# Patient Record
Sex: FEMALE | Race: WHITE | NOT HISPANIC OR LATINO
[De-identification: names, ages, dates, MRNs, and addresses within clinical notes are randomized per-mention and may not be internally consistent; named-entity substitution may affect disease eponyms.]

---

## 2021-07-21 ENCOUNTER — RESULT CHARGE (OUTPATIENT)
Age: 51
End: 2021-07-21

## 2021-07-22 ENCOUNTER — APPOINTMENT (OUTPATIENT)
Dept: PEDIATRIC CARDIOLOGY | Facility: CLINIC | Age: 51
End: 2021-07-22
Payer: COMMERCIAL

## 2021-07-22 VITALS
HEIGHT: 64.57 IN | HEART RATE: 63 BPM | OXYGEN SATURATION: 98 % | WEIGHT: 171.96 LBS | SYSTOLIC BLOOD PRESSURE: 125 MMHG | DIASTOLIC BLOOD PRESSURE: 80 MMHG | BODY MASS INDEX: 29 KG/M2

## 2021-07-22 PROCEDURE — 99244 OFF/OP CNSLTJ NEW/EST MOD 40: CPT

## 2021-07-22 PROCEDURE — 93320 DOPPLER ECHO COMPLETE: CPT

## 2021-07-22 PROCEDURE — 93325 DOPPLER ECHO COLOR FLOW MAPG: CPT

## 2021-07-22 PROCEDURE — 93000 ELECTROCARDIOGRAM COMPLETE: CPT

## 2021-07-22 PROCEDURE — 93303 ECHO TRANSTHORACIC: CPT

## 2021-07-22 PROCEDURE — 99072 ADDL SUPL MATRL&STAF TM PHE: CPT

## 2021-07-22 NOTE — PHYSICAL EXAM
[General Appearance - Alert] : alert [Sclera] : the conjunctiva were normal [Examination Of The Oral Cavity] : mucous membranes were moist and pink [No Cough] : no cough [Sternotomy] : sternotomy [Well-Healed] : well-healed [Unremarkable] : unremarkable [Apical Impulse] : quiet precordium with normal apical impulse [Heart Rate And Rhythm] : normal heart rate and rhythm [Heart Sounds] : normal S1 and S2 [No Murmur] : no murmurs  [2+] : left 2+ [Abdomen Soft] : soft [] : no hepato-splenomegaly [Nail Clubbing] : no clubbing  or cyanosis of the fingers [Motor Tone] : normal muscle strength and tone [Skin Turgor] : normal turgor [Demonstrated Behavior - Infant Nonreactive To Parents] : interactive [Facies] : the head and face were normal in appearance [Edema] : no edema

## 2021-08-11 NOTE — REASON FOR VISIT
[Follow-Up] : a follow-up visit for [Ebstein's Anomaly] : Ebstein's anomaly [Patient] : patient [FreeTextEntry1] : s/p surgical correction, atrial reduction, Bidirectional Hi [FreeTextEntry3] : pressure in chest and swollen feet upon returning from [place]

## 2021-08-11 NOTE — CARDIOLOGY SUMMARY
[Today's Date] : [unfilled] [FreeTextEntry1] : NSR, ventricular rate 63 bpm, RBBB [FreeTextEntry2] : Summary:\par 1. 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 Status post placement of right bidirectional Hi shunt.\par 3. No residual interatrial shunt identified.\par 4. There is severe apical displacement of the septal leaflet with lack of delamination. There are\par attachments of the anterior leaflet to the anterior free wall and apex of the right ventricle. There appears\par to be redundancy of the anterior leaflet near the annulus - giving an appearance of flailed leaflet. There\par are multiple small jets of insufficiency through the anterior leaflet. There is lack of coaptation of the\par posterior leaflet with a large jet of insufficiency seen more posteriorly. There is at least moderate\par tricuspid regurgitation. The atrialized portion of the right ventricle functions as a "sink hole" and mostly\par swirling flow seen within that portion.\par 5. Severely dilated right atrium.\par 6. Laminar, low velocity Doppler flow pattern seen in the SVC. Hi anastomosis not well visualized.\par 7. Low velocity but pulsatile flow across the pulmonary valve.\par 8. The right pulmonary artery visualized with limited imaging, the left pulmonary artery could not be seen\par well.\par 9. Significant portion of the right ventricle is atrialized; qualitatively right ventricular systolic function\par appears mildly depressed.\par 10. Normal systolic excursion of the posterior wall of the left ventricle.\par 11. No pericardial effusion.

## 2021-08-11 NOTE — HISTORY OF PRESENT ILLNESS
[FreeTextEntry1] : We had the pleasure of seeing Pérez Wellington in the Adult [facility] of [facility] on July 22, 2021. Pérez has not been seen in our office since 2015. She returns today to re-establish care. Pérez is a 51 year old female with Ebstein's anomaly, s/p tricuspid valve repair, closure of atrial septal defect, atrial reduction, intraoperative atrial arrhythmia ablation and bidirectional Hi anastomosis. She has had two successful pregnancies with no significant cardiac complications.\par \par She continues to work as a psychotherapist and has been extremely busy since COVID. She Has been vaccinated with the Moderna vaccine.She does not participate in any structured exercise routine. She has been well with the exception one episode of chest pressure 3-4 weeks ago followed by one day of lower extremity edema. This occurred one day following a flight home from [place] and resolved within one day. She has had no re- occurrence.\par \par Her cardiovascular review of systems is completely unremarkable. Specifically, she has no complaints of chest pain, palpitations, shortness of breath, peripheral edema, dizziness or syncope.\par

## 2021-08-11 NOTE — DISCUSSION/SUMMARY
[FreeTextEntry1] : In summary, Pérez is a 51 year old female with Ebstein's anomaly who is status post Hi anastomosis and intraoperative radiofrequency ablation. She has had a 6 year lapse in care and now returns to re-establish care in our office. She  has had an uneventful interim course until a few weeks ago which probably prompted her return visit.\par \par Her bidirectional Hi and pulmonary arteries do not image well so we discussed obtaining a cardiac CT scan to better document her Hi and branch PA's. Otherwise her echocardiogram demonstrated a severely dilated RA with moderate TR and mildly depressed systolic RV function. We are able to see laminar flow in her SVC. We placed a Holter and discussed obtaining an exercise stress test once she has  conditioned herself better with a daily exercise routine.\par \par We will notify her of her Holter results and schedule her for her CT scan. She should continue to be followed annually or sooner if clinically indicated. [Needs SBE Prophylaxis] : [unfilled] does not need bacterial endocarditis prophylaxis

## 2021-08-11 NOTE — CONSULT LETTER
[Today's Date] : [unfilled] [Name] : Name: [unfilled] [] : : ~~ [Today's Date:] : [unfilled] [Dear  ___:] : Dear Dr. [unfilled]: [Consult] : I had the pleasure of evaluating your patient, [unfilled]. My full evaluation follows. [Sincerely,] : Sincerely, [Consult - Multiple Provider] : Thank you very much for allowing us to participate in the care of this patient. If you have any questions, please do not hesitate to contact us. [FreeTextEntry4] : Brady NATHAN MD [FreeTextEntry5] : [address] [place] [address], [address] [FreeTextEntry6] : [place]  [address] [FreeTextEnumf2] : Phone: [phone] [de-identified] : Sol Kee, MSN, CPNP-AC, PC\par Pediatric Cardiology, Adult Congenital Cardiology\par [facility]\par [facility]\par \par Delta Hope MD, GERARDO\par Medical Director, Adult Congenital Heart Program\par Professor of Pediatrics\par [employer] [employer] [facility]\par

## 2021-09-08 ENCOUNTER — APPOINTMENT (OUTPATIENT)
Dept: CT IMAGING | Facility: HOSPITAL | Age: 51
End: 2021-09-08

## 2021-09-23 ENCOUNTER — OUTPATIENT (OUTPATIENT)
Dept: OUTPATIENT SERVICES | Facility: HOSPITAL | Age: 51
LOS: 1 days | End: 2021-09-23

## 2021-09-23 ENCOUNTER — NON-APPOINTMENT (OUTPATIENT)
Age: 51
End: 2021-09-23

## 2021-09-23 ENCOUNTER — APPOINTMENT (OUTPATIENT)
Dept: CT IMAGING | Facility: HOSPITAL | Age: 51
End: 2021-09-23
Payer: COMMERCIAL

## 2021-09-23 ENCOUNTER — RESULT REVIEW (OUTPATIENT)
Age: 51
End: 2021-09-23

## 2021-09-23 DIAGNOSIS — Q22.5 EBSTEIN'S ANOMALY: ICD-10-CM

## 2021-09-23 PROCEDURE — 75573 CT HRT C+ STRUX CGEN HRT DS: CPT | Mod: 26

## 2021-09-27 ENCOUNTER — NON-APPOINTMENT (OUTPATIENT)
Age: 51
End: 2021-09-27

## 2023-10-23 ENCOUNTER — INPATIENT (INPATIENT)
Facility: HOSPITAL | Age: 53
LOS: 0 days | Discharge: HOME | DRG: 310 | End: 2023-10-24
Attending: INTERNAL MEDICINE | Admitting: INTERNAL MEDICINE
Payer: COMMERCIAL

## 2023-10-23 VITALS
RESPIRATION RATE: 16 BRPM | WEIGHT: 169.09 LBS | HEART RATE: 83 BPM | DIASTOLIC BLOOD PRESSURE: 91 MMHG | OXYGEN SATURATION: 100 % | SYSTOLIC BLOOD PRESSURE: 149 MMHG | TEMPERATURE: 98 F | HEIGHT: 66 IN

## 2023-10-23 DIAGNOSIS — I48.91 UNSPECIFIED ATRIAL FIBRILLATION: ICD-10-CM

## 2023-10-23 LAB
ALBUMIN SERPL ELPH-MCNC: 4.6 G/DL — SIGNIFICANT CHANGE UP (ref 3.3–5)
ALBUMIN SERPL ELPH-MCNC: 4.6 G/DL — SIGNIFICANT CHANGE UP (ref 3.3–5)
ALP SERPL-CCNC: 100 U/L — SIGNIFICANT CHANGE UP (ref 40–120)
ALP SERPL-CCNC: 100 U/L — SIGNIFICANT CHANGE UP (ref 40–120)
ALT FLD-CCNC: 23 U/L — SIGNIFICANT CHANGE UP (ref 10–45)
ALT FLD-CCNC: 23 U/L — SIGNIFICANT CHANGE UP (ref 10–45)
ANION GAP SERPL CALC-SCNC: 13 MMOL/L — SIGNIFICANT CHANGE UP (ref 5–17)
ANION GAP SERPL CALC-SCNC: 13 MMOL/L — SIGNIFICANT CHANGE UP (ref 5–17)
APTT BLD: 38.1 SEC — HIGH (ref 24.5–35.6)
APTT BLD: 38.1 SEC — HIGH (ref 24.5–35.6)
AST SERPL-CCNC: 29 U/L — SIGNIFICANT CHANGE UP (ref 10–40)
AST SERPL-CCNC: 29 U/L — SIGNIFICANT CHANGE UP (ref 10–40)
BASOPHILS # BLD AUTO: 0.01 K/UL — SIGNIFICANT CHANGE UP (ref 0–0.2)
BASOPHILS # BLD AUTO: 0.01 K/UL — SIGNIFICANT CHANGE UP (ref 0–0.2)
BASOPHILS NFR BLD AUTO: 0.2 % — SIGNIFICANT CHANGE UP (ref 0–2)
BASOPHILS NFR BLD AUTO: 0.2 % — SIGNIFICANT CHANGE UP (ref 0–2)
BILIRUB SERPL-MCNC: 1 MG/DL — SIGNIFICANT CHANGE UP (ref 0.2–1.2)
BILIRUB SERPL-MCNC: 1 MG/DL — SIGNIFICANT CHANGE UP (ref 0.2–1.2)
BUN SERPL-MCNC: 13 MG/DL — SIGNIFICANT CHANGE UP (ref 7–23)
BUN SERPL-MCNC: 13 MG/DL — SIGNIFICANT CHANGE UP (ref 7–23)
CALCIUM SERPL-MCNC: 9.8 MG/DL — SIGNIFICANT CHANGE UP (ref 8.4–10.5)
CALCIUM SERPL-MCNC: 9.8 MG/DL — SIGNIFICANT CHANGE UP (ref 8.4–10.5)
CHLORIDE SERPL-SCNC: 106 MMOL/L — SIGNIFICANT CHANGE UP (ref 96–108)
CHLORIDE SERPL-SCNC: 106 MMOL/L — SIGNIFICANT CHANGE UP (ref 96–108)
CO2 SERPL-SCNC: 21 MMOL/L — LOW (ref 22–31)
CO2 SERPL-SCNC: 21 MMOL/L — LOW (ref 22–31)
CREAT SERPL-MCNC: 0.54 MG/DL — SIGNIFICANT CHANGE UP (ref 0.5–1.3)
CREAT SERPL-MCNC: 0.54 MG/DL — SIGNIFICANT CHANGE UP (ref 0.5–1.3)
EGFR: 111 ML/MIN/1.73M2 — SIGNIFICANT CHANGE UP
EGFR: 111 ML/MIN/1.73M2 — SIGNIFICANT CHANGE UP
EOSINOPHIL # BLD AUTO: 0.04 K/UL — SIGNIFICANT CHANGE UP (ref 0–0.5)
EOSINOPHIL # BLD AUTO: 0.04 K/UL — SIGNIFICANT CHANGE UP (ref 0–0.5)
EOSINOPHIL NFR BLD AUTO: 1 % — SIGNIFICANT CHANGE UP (ref 0–6)
EOSINOPHIL NFR BLD AUTO: 1 % — SIGNIFICANT CHANGE UP (ref 0–6)
GLUCOSE SERPL-MCNC: 97 MG/DL — SIGNIFICANT CHANGE UP (ref 70–99)
GLUCOSE SERPL-MCNC: 97 MG/DL — SIGNIFICANT CHANGE UP (ref 70–99)
HCT VFR BLD CALC: 38.6 % — SIGNIFICANT CHANGE UP (ref 34.5–45)
HCT VFR BLD CALC: 38.6 % — SIGNIFICANT CHANGE UP (ref 34.5–45)
HGB BLD-MCNC: 12.5 G/DL — SIGNIFICANT CHANGE UP (ref 11.5–15.5)
HGB BLD-MCNC: 12.5 G/DL — SIGNIFICANT CHANGE UP (ref 11.5–15.5)
IMM GRANULOCYTES NFR BLD AUTO: 0.5 % — SIGNIFICANT CHANGE UP (ref 0–0.9)
IMM GRANULOCYTES NFR BLD AUTO: 0.5 % — SIGNIFICANT CHANGE UP (ref 0–0.9)
INR BLD: 1.14 RATIO — SIGNIFICANT CHANGE UP (ref 0.85–1.18)
INR BLD: 1.14 RATIO — SIGNIFICANT CHANGE UP (ref 0.85–1.18)
LYMPHOCYTES # BLD AUTO: 0.83 K/UL — LOW (ref 1–3.3)
LYMPHOCYTES # BLD AUTO: 0.83 K/UL — LOW (ref 1–3.3)
LYMPHOCYTES # BLD AUTO: 20.4 % — SIGNIFICANT CHANGE UP (ref 13–44)
LYMPHOCYTES # BLD AUTO: 20.4 % — SIGNIFICANT CHANGE UP (ref 13–44)
MCHC RBC-ENTMCNC: 29.5 PG — SIGNIFICANT CHANGE UP (ref 27–34)
MCHC RBC-ENTMCNC: 29.5 PG — SIGNIFICANT CHANGE UP (ref 27–34)
MCHC RBC-ENTMCNC: 32.4 GM/DL — SIGNIFICANT CHANGE UP (ref 32–36)
MCHC RBC-ENTMCNC: 32.4 GM/DL — SIGNIFICANT CHANGE UP (ref 32–36)
MCV RBC AUTO: 91 FL — SIGNIFICANT CHANGE UP (ref 80–100)
MCV RBC AUTO: 91 FL — SIGNIFICANT CHANGE UP (ref 80–100)
MONOCYTES # BLD AUTO: 0.34 K/UL — SIGNIFICANT CHANGE UP (ref 0–0.9)
MONOCYTES # BLD AUTO: 0.34 K/UL — SIGNIFICANT CHANGE UP (ref 0–0.9)
MONOCYTES NFR BLD AUTO: 8.4 % — SIGNIFICANT CHANGE UP (ref 2–14)
MONOCYTES NFR BLD AUTO: 8.4 % — SIGNIFICANT CHANGE UP (ref 2–14)
NEUTROPHILS # BLD AUTO: 2.83 K/UL — SIGNIFICANT CHANGE UP (ref 1.8–7.4)
NEUTROPHILS # BLD AUTO: 2.83 K/UL — SIGNIFICANT CHANGE UP (ref 1.8–7.4)
NEUTROPHILS NFR BLD AUTO: 69.5 % — SIGNIFICANT CHANGE UP (ref 43–77)
NEUTROPHILS NFR BLD AUTO: 69.5 % — SIGNIFICANT CHANGE UP (ref 43–77)
NRBC # BLD: 0 /100 WBCS — SIGNIFICANT CHANGE UP (ref 0–0)
NRBC # BLD: 0 /100 WBCS — SIGNIFICANT CHANGE UP (ref 0–0)
PLATELET # BLD AUTO: 144 K/UL — LOW (ref 150–400)
PLATELET # BLD AUTO: 144 K/UL — LOW (ref 150–400)
POTASSIUM SERPL-MCNC: 5 MMOL/L — SIGNIFICANT CHANGE UP (ref 3.5–5.3)
POTASSIUM SERPL-MCNC: 5 MMOL/L — SIGNIFICANT CHANGE UP (ref 3.5–5.3)
POTASSIUM SERPL-SCNC: 5 MMOL/L — SIGNIFICANT CHANGE UP (ref 3.5–5.3)
POTASSIUM SERPL-SCNC: 5 MMOL/L — SIGNIFICANT CHANGE UP (ref 3.5–5.3)
PROT SERPL-MCNC: 7.5 G/DL — SIGNIFICANT CHANGE UP (ref 6–8.3)
PROT SERPL-MCNC: 7.5 G/DL — SIGNIFICANT CHANGE UP (ref 6–8.3)
PROTHROM AB SERPL-ACNC: 12.5 SEC — SIGNIFICANT CHANGE UP (ref 9.5–13)
PROTHROM AB SERPL-ACNC: 12.5 SEC — SIGNIFICANT CHANGE UP (ref 9.5–13)
RBC # BLD: 4.24 M/UL — SIGNIFICANT CHANGE UP (ref 3.8–5.2)
RBC # BLD: 4.24 M/UL — SIGNIFICANT CHANGE UP (ref 3.8–5.2)
RBC # FLD: 14 % — SIGNIFICANT CHANGE UP (ref 10.3–14.5)
RBC # FLD: 14 % — SIGNIFICANT CHANGE UP (ref 10.3–14.5)
SODIUM SERPL-SCNC: 140 MMOL/L — SIGNIFICANT CHANGE UP (ref 135–145)
SODIUM SERPL-SCNC: 140 MMOL/L — SIGNIFICANT CHANGE UP (ref 135–145)
WBC # BLD: 4.07 K/UL — SIGNIFICANT CHANGE UP (ref 3.8–10.5)
WBC # BLD: 4.07 K/UL — SIGNIFICANT CHANGE UP (ref 3.8–10.5)
WBC # FLD AUTO: 4.07 K/UL — SIGNIFICANT CHANGE UP (ref 3.8–10.5)
WBC # FLD AUTO: 4.07 K/UL — SIGNIFICANT CHANGE UP (ref 3.8–10.5)

## 2023-10-23 PROCEDURE — 70498 CT ANGIOGRAPHY NECK: CPT | Mod: 26,MA

## 2023-10-23 PROCEDURE — 70496 CT ANGIOGRAPHY HEAD: CPT | Mod: 26,MA

## 2023-10-23 PROCEDURE — 99223 1ST HOSP IP/OBS HIGH 75: CPT

## 2023-10-23 PROCEDURE — 70450 CT HEAD/BRAIN W/O DYE: CPT | Mod: 26,MA,59

## 2023-10-23 PROCEDURE — 93010 ELECTROCARDIOGRAM REPORT: CPT

## 2023-10-23 PROCEDURE — 71045 X-RAY EXAM CHEST 1 VIEW: CPT | Mod: 26

## 2023-10-23 PROCEDURE — 99285 EMERGENCY DEPT VISIT HI MDM: CPT

## 2023-10-23 RX ORDER — MECLIZINE HCL 12.5 MG
25 TABLET ORAL ONCE
Refills: 0 | Status: COMPLETED | OUTPATIENT
Start: 2023-10-23 | End: 2023-10-23

## 2023-10-23 RX ORDER — APIXABAN 2.5 MG/1
5 TABLET, FILM COATED ORAL EVERY 12 HOURS
Refills: 0 | Status: DISCONTINUED | OUTPATIENT
Start: 2023-10-23 | End: 2023-10-24

## 2023-10-23 RX ADMIN — APIXABAN 5 MILLIGRAM(S): 2.5 TABLET, FILM COATED ORAL at 22:09

## 2023-10-23 RX ADMIN — Medication 25 MILLIGRAM(S): at 10:47

## 2023-10-23 NOTE — CONSULT NOTE ADULT - SUBJECTIVE AND OBJECTIVE BOX
Patient seen and evaluated at bedside    Chief Complaint: dizziness    HPI: 53 year old F with Ebstein's anomaly s/p b/l Hi, closure of atrial communication, and ablation of atrial flutter in 11/2002 by Dr. Ly (last seen by Dr. Hope 7/22/21) who presents for dizziness x 3 days.    She hasn't been seen in [facility] clinic since 2021. She is not on any medications at this time. She states she was in [facility] until 3 days ago when she started to have "dizziness/room spinning sensation" that occurred intermittently. She denies CP, SOB, orthopnea, PND, LE edema, LOC, or palpitations.       PMHx:       PSHx:       Allergies:  tetracycline (Rash)  erythromycin (Unknown)  penicillin (Unknown)      Home Meds:  None    FAMILY HISTORY:  N/A    Social History:  no toxic habits    REVIEW OF SYSTEMS:  CONSTITUTIONAL: No weakness, fevers or chills  EYES/ENT: No visual changes;  No dysphagia  NECK: No pain or stiffness  RESPIRATORY: No cough, wheezing, hemoptysis; No shortness of breath  CARDIOVASCULAR: No chest pain or palpitations; No lower extremity edema  GASTROINTESTINAL: No abdominal or epigastric pain. No nausea, vomiting, or hematemesis; No diarrhea or constipation. No melena or hematochezia.  BACK: No back pain  GENITOURINARY: No dysuria, frequency or hematuria  NEUROLOGICAL: No numbness or weakness  SKIN: No itching, burning, rashes, or lesions   All other review of systems is negative unless indicated above.    Physical Exam:  T(F): 98.5 (10-23), Max: 98.5 (10-23)  HR: 75 (10-23) (75 - 83)  BP: 133/88 (10-23) (133/88 - 149/91)  RR: 18 (10-23)  SpO2: 100% (10-23)  GENERAL: No acute distress, well-developed  HEAD:  Atraumatic, Normocephalic  ENT: EOMI, PERRLA, conjunctiva and sclera clear, Neck supple, No JVD, moist mucosa  CHEST/LUNG: Clear to auscultation bilaterally; No wheeze, equal breath sounds bilaterally   BACK: No spinal tenderness  HEART: Regular rate and rhythm; No murmurs, rubs, or gallops  ABDOMEN: Soft, Nontender, Nondistended; Bowel sounds present  EXTREMITIES:  No clubbing, cyanosis, or edema  PSYCH: Nl behavior, nl affect  NEUROLOGY: AAOx3, non-focal, cranial nerves intact  SKIN: Normal color, No rashes or lesions  LINES:    Labs:  reviewed                        12.5   4.07  )-----------( 144      ( 23 Oct 2023 10:54 )             38.6     10-23    140  |  106  |  13  ----------------------------<  97  5.0   |  21<L>  |  0.54    Ca    9.8      23 Oct 2023 10:54  Mg     2.1     10-23    TPro  7.5  /  Alb  4.6  /  TBili  1.0  /  DBili  x   /  AST  29  /  ALT  23  /  AlkPhos  100  10-23    PT/INR - ( 23 Oct 2023 11:20 )   PT: 12.5 sec;   INR: 1.14 ratio         PTT - ( 23 Oct 2023 11:20 )  PTT:38.1 sec    CARDIAC MARKERS ( 23 Oct 2023 10:54 )  7 ng/L / x     / x     / x     / x     / x      Cardiovascular Diagnostic Testing:    ECG: AFib with RBBB    Cardiac CT 9/2021: Impression: Known Ebstein anomaly. Qualitatively markedly dilated right atrium and right ventricle.    The right bidirectional Hi pathway appears patent, with the Hi connecting to the right pulmonary artery. The flow from SVC is directed to the RPA. The normal sized branch pulmonary arteries with no obvious discrete stenosis. Study non diagnostic for ruling out thrombus due to differential flow and poor contrast opacification.    Multiple veno venous collaterals (at least one prominent) seen from the SVC. The network of collaterals end in the mid right lung field.    Echo 7/2021: 1. 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  plication, right atrial MAZE procedure, patch closure of the secundum atrial septal defect and  ?  bidirectional Hi procedure.  ?  2. Status post placement of right bidirectional Hi shunt.  ?  3. No residual interatrial shunt identified.  ?  4. There is severe apical displacement of the septal leaflet with lack of delamination. There are  ?  attachments of the anterior leaflet to the anterior free wall and apex of the right ventricle. There appears  ?  to be redundancy of the anterior leaflet near the annulus - giving an appearance of flailed leaflet. There  ?  are multiple small jets of insufficiency through the anterior leaflet. There is lack of coaptation of the  ?  posterior leaflet with a large jet of insufficiency seen more posteriorly. There is at least moderate  ?  tricuspid regurgitation. The atrialized portion of the right ventricle functions as a "sink hole" and mostly  ?  swirling flow seen within that portion.  ?  5. Severely dilated right atrium.  ?  6. Laminar, low velocity Doppler flow pattern seen in the SVC. Hi anastomosis not well visualized.  ?  7. Low velocity but pulsatile flow across the pulmonary valve.  ?  8. The right pulmonary artery visualized with limited imaging, the left pulmonary artery could not be seen  ?  well.  ?  9. Significant portion of the right ventricle is atrialized; qualitatively right ventricular systolic function  ?  appears mildly depressed.  ?  10. Normal systolic excursion of the posterior wall of the left ventricle.  ?  11. No pericardial effusion.             
    HPI:53 year old F with Ebstein's anomaly s/p b/l Hi, closure of atrial communication, and ablation of atrial flutter in 11/2002 by Dr. Ly (last seen by Dr. Hope 7/22/21) who presents for dizziness x 3 days.    She hasn't been seen in [facility] clinic since 2021. She is not on any medications at this time. She states she was in [facility] until 3 days ago when she started to have "dizziness/room spinning sensation" that occurred intermittently. She denies CP, SOB, orthopnea, PND, LE edema, LOC, or palpitations.       PMH:   Ada Anomaly s/p B/L Hi Procedure  AF Ablation     PSH:     Medications:     Allergies:  tetracycline (Rash)  erythromycin (Unknown)  penicillin (Unknown)    FAMILY HISTORY:    Social History:  Smoking: denies  Alcohol: denies  Drugs: denies    Review of Systems:  Constitutional: [ ] Fever [ ] Chills [ ] Fatigue [ ] Weight change   HEENT: [ ] Blurred vision [ ] Eye Pain [ ] Headache [ ] Runny nose [ ] Sore Throat   Respiratory: [ ] Cough [ ] Wheezing [ ] Shortness of breath  Cardiovascular: [ ] Chest Pain [ ] Palpitations [ ] PARIKH [ ] PND [ ] Orthopnea  Gastrointestinal: [ ] Abdominal Pain [ ] Diarrhea [ ] Constipation [ ] Hemorrhoids [ ] Nausea [ ] Vomiting  Genitourinary: [ ] Nocturia [ ] Dysuria [ ] Incontinence  Extremities: [ ] Swelling [ ] Joint Pain  Neurologic: [ ] Focal deficit [ ] Paresthesias [ ] Syncope  Lymphatic: [ ] Swelling [ ] Lymphadenopathy   Skin: [ ] Rash [ ] Ecchymoses [ ] Wounds [ ] Lesions  Psychiatry: [ ] Depression [ ] Suicidal/Homicidal Ideation [ ] Anxiety [ ] Sleep Disturbances  [X ] 10 point review of systems is otherwise negative except as mentioned above            [ ]Unable to obtain    Physical Exam:  T(C): 36.9 (10-23-23 @ 11:15), Max: 36.9 (10-23-23 @ 11:15)  HR: 75 (10-23-23 @ 14:00) (75 - 83)  BP: 133/88 (10-23-23 @ 14:00) (133/88 - 149/91)  RR: 18 (10-23-23 @ 14:00) (16 - 18)  SpO2: 100% (10-23-23 @ 14:00) (100% - 100%)  Wt(kg): --    Daily Height in cm: 167.64 (23 Oct 2023 09:53)    Daily     Appearance: NAD  Eyes: PERRL, EOMI  HENT: Normal oral mucosa, NC/AT  Cardiovascular: normal S1 and S2, RRR, no m/r/g, no edema, normal JVP  Procedural Access Site:  No hematoma, non-tender to palpation, 2+ pulses distally, no bruit, no ecchymosis  Respiratory: Clear to auscultation bilaterally  Gastrointestinal: Soft, non-tender, non-distended, BS+  Musculoskeletal: No clubbing, no joint deformity   Neurologic: Non-focal  Lymphatic: No lymphadenopathy  Psychiatry: AAOx3, mood & affect appropriate  Skin: No rashes, no ecchymoses, no cyanosis    Cardiovascular Diagnostic Testing:        Labs:                        12.5   4.07  )-----------( 144      ( 23 Oct 2023 10:54 )             38.6     10-23    140  |  106  |  13  ----------------------------<  97  5.0   |  21<L>  |  0.54    Ca    9.8      23 Oct 2023 10:54  Mg     2.1     10-23    TPro  7.5  /  Alb  4.6  /  TBili  1.0  /  DBili  x   /  AST  29  /  ALT  23  /  AlkPhos  100  10-23    PT/INR - ( 23 Oct 2023 11:20 )   PT: 12.5 sec;   INR: 1.14 ratio         PTT - ( 23 Oct 2023 11:20 )  PTT:38.1 sec

## 2023-10-23 NOTE — ED PROVIDER NOTE - PROGRESS NOTE DETAILS
Manju Vaughn PGY2: EKG shows afib. Pt states she has not seen cardiologist or PCP in years and is not on any medications including AC. Will add trop, coags and BNP. Dispo likely admission for cardiology eval and comprehensive echo.

## 2023-10-23 NOTE — ED PROVIDER NOTE - DIFFERENTIAL DIAGNOSIS
Ddx includes, however, is not limited to: BPPV, arrhythmia, metabolic d/o, other Differential Diagnosis

## 2023-10-23 NOTE — ED PROVIDER NOTE - ATTENDING CONTRIBUTION TO CARE
I saw and evaluated patient with resident. I discussed H+P and MDM with resident. I agree with the statements made by the resident unless otherwise noted.
Finger infection

## 2023-10-23 NOTE — ED PROVIDER NOTE - OBJECTIVE STATEMENT
52 y/o F with PMHx of HTN, afib s/p ablation (not on AC), mitral valve repair, Ada abnormality presents to the ED for 3 days of dizziness. Pt states she has had intermittent sensation of room spinning over the last 3 days. Worse at night and with laying flat. Pt also endorses associated nausea and 1 episode of vomiting. Pt states she took her blood pressure due to these symptoms and it was elevated to 150s/100s. Pt states she was started on BP meds but only took them once a few years ago, states she "drinks tea to lower the blood pressure". Also endorses mild headache. Denies fevers, chills, vision changes, CP, SOB, abd pain, diarrhea, constipation, dysuria, hematuria.

## 2023-10-23 NOTE — ED PROVIDER NOTE - CLINICAL SUMMARY MEDICAL DECISION MAKING FREE TEXT BOX
54 y/o F with PMHx of HTN, afib s/p ablation (not on AC), mitral valve repair, Ada abnormality presents to the ED for 3 days of intermittent dizziness with n/v and hypertension. + horizontal nystagmus on exam. No other focal deficits. Most likely BPPV. Plan for labs, EKG and trial of meclizine. Will consider CT is no improvement in symptoms from meclizine. Dispo likely home. 54 y/o F with PMHx of HTN, afib s/p ablation (not on AC), mitral valve repair, Ada abnormality presents to the ED for 3 days of intermittent dizziness with n/v and hypertension. + horizontal nystagmus on exam. No other focal deficits. Most likely BPPV. Plan for labs, EKG and trial of meclizine. Will consider CT is no improvement in symptoms from meclizine. Dispo likely home.    APOLINAR Tompkins MD: Agree with resident/ACP MDM, assessment and plan as above. Pt appears to be in new onset Afib, which may be contributing to her sx. Plan for cardiac w/u as well, d/w cardiology - may require inpt admission for echo, rate control, anticoagulation

## 2023-10-23 NOTE — H&P ADULT - HISTORY OF PRESENT ILLNESS
54 y/o F with PMHx of HTN, afib s/p ablation (not on AC), mitral valve repair, Daa abnormality presents to the ED for 3 days of dizziness. Pt states she has had intermittent sensation of room spinning over the last 3 days. Worse at night and with laying flat. Pt also endorses associated nausea and 1 episode of vomiting. Pt states she took her blood pressure due to these symptoms and it was elevated to 150s/100s. Pt states she was started on BP meds but only took them once a few years ago, states she "drinks tea to lower the blood pressure". Also endorses mild headache. Denies fevers, chills, vision changes, CP, SOB, abd pain, diarrhea, constipation, dysuria, hematuria.   54 y/o F with PMHx of HTN, afib s/p ablation (not on AC), mitral valve repair, Ebstein abnormality presents to the ED for 3 days of dizziness. Pt states she has had intermittent sensation of room spinning over the last 3 days. Worse at night and with laying flat. Pt also endorses associated nausea and 1 episode of vomiting. Pt states she took her blood pressure due to these symptoms and it was elevated to 150s/100s. Pt states she was started on BP meds but only took them once a few years ago, states she "drinks tea to lower the blood pressure". Also endorses mild headache. Denies fevers, chills, vision changes, CP, SOB, abd pain, diarrhea, constipation, dysuria, hematuria.

## 2023-10-23 NOTE — CONSULT NOTE ADULT - ASSESSMENT
Assessment and Recommendations:  53 year old F with Ebstein's anomaly s/p b/l Hi, closure of atrial communication, and ablation of atrial flutter in 11/2002 by Dr. Ly (last seen by Dr. Hope 7/22/21) who presents for dizziness x 3 days, found to be in atrial fibrillation.    1) Ebstein's anomaly s/p b/l Hi  2) AFLUT ablation intraop  - She appears grossly euvolemic on exam  - EKG shows Afib with RBBB  - It is unclear if her dizziness is related to the atrial fibrillation  - f/u CTA H and N and NCHCT   - Check TTE  - Start DOAC  - Given her history of Ebstein's, I consulted EP to consider DCCV vs ablation. Will need TERESA prior. F/U EP recs  - I reached out to Dr. Hope's [facility] team as well (Dr. Quevedo as Dr. Hope no longer works at [facility])     Karl Cortez MD  Cardiology Attending    All Cardiology service information can be found 24/7 on [email or web address], password: [email or web address]

## 2023-10-23 NOTE — CONSULT NOTE ADULT - ASSESSMENT
Ms. Wellington is a 53F with a PMH of Ada Anomaly s/p tricuspid valve annuloplasty, Bidirectional Hi procedure, closure of ASD, and AFL Ablation 11/2002 presenting with dizziness x 3 days.     ECG: AFib with RBBB and possible LPFB    1. Dizziness  2. AF - CHADSVASC = 2 s/p AFL ablation 2002  3. Ada Anomaly s/p tricuspid valve annuloplasty, bidirectional Hi procedure, closure of ASD      RECOMMENDATIONS:            Note not final until signed by attending       Tammy Benitez MD  Cardiology Fellow PGY-6  Phone: [phone]    For all New Consults  [email or web address]   Login: [email or web address] Ms. Wellington is a 53F with a PMH of Ada Anomaly s/p tricuspid valve annuloplasty, Bidirectional Hi procedure, closure of ASD, and AFL Ablation 11/2002 presenting with dizziness x 3 days.     ECG: AFib with RBBB and possible LPFB    1. Dizziness  2. AF - CHADSVASC = 2 s/p AFL ablation 2002  3. Ada Anomaly s/p tricuspid valve annuloplasty, bidirectional Hi procedure, closure of ASD      RECOMMENDATIONS:            Note not final until signed by attending       Tammy Benitez MD  Cardiology Fellow PGY-6  Phone: [phone]    For all New Consults  [email or web address]   Login: [employer] Ms. Wellington is a 53F with a PMH of Ada Anomaly s/p tricuspid valve annuloplasty, Bidirectional Hi procedure, closure of ASD, and AFL Ablation 11/2002 presenting with dizziness x 3 days.     ECG: AFib with RBBB and possible LPFB    1. Dizziness  2. AF - CHADSVASC = 2 s/p AFL ablation 2002  3. Ada Anomaly s/p tricuspid valve annuloplasty, bidirectional Hi procedure, closure of ASD      RECOMMENDATIONS:            Note not final until signed by attending       Tammy Benitez MD  Cardiology Fellow PGY-6  Phone: [phone]    For all New Consults  [email or web address]   Login: [employer] Ms. Wellington is a 53F with a PMH of Ebstein Anomaly s/p tricuspid valve annuloplasty, Bidirectional Hi procedure, closure of ASD, and AFL Ablation 11/2002 presenting with dizziness x 3 days.     ECG: AFib with RBBB and possible LPFB    1. Dizziness  2. AF - CHADSVASC = 2 s/p AFL ablation 2002  3. Ebstein Anomaly s/p tricuspid valve annuloplasty, bidirectional Hi procedure, closure of ASD      RECOMMENDATIONS:            Note not final until signed by attending       Tammy Benitez MD  Cardiology Fellow PGY-6  Phone: [phone]    For all New Consults  [email or web address]   Login: [employer] Ms. Wellington is a 53F with a PMH of Ebstein Anomaly s/p tricuspid valve annuloplasty, Bidirectional Hi procedure, closure of ASD, and AFL Ablation 11/2002 presenting with dizziness x 3 days.     ECG: AFib with RBBB and possible LPFB    1. Dizziness  2. AF - CHADSVASC = 2 s/p AFL ablation 2002  3. Ebstein Anomaly s/p tricuspid valve annuloplasty, bidirectional Hi procedure, closure of ASD      RECOMMENDATIONS:            Note not final until signed by attending       Tammy Benitez MD  Cardiology Fellow PGY-6  Phone: [phone]    For all New Consults  [email or web address]   Login: [employer] Ms. Wellington is a 53F with a PMH of Ebstein Anomaly s/p tricuspid valve annuloplasty, Bidirectional Hi procedure, closure of ASD, and AFL Ablation 11/2002 presenting with dizziness x 3 days.     ECG: AFib with RBBB and possible LPFB    1. Dizziness  2. AF - CHADSVASC = 2 s/p AFL ablation 2002  3. Ebstein Anomaly s/p tricuspid valve annuloplasty, bidirectional Hi procedure, closure of ASD      RECOMMENDATIONS:            Note not final until signed by attending       Tammy Benitez MD  Cardiology Fellow PGY-6  Phone: [phone]    For all New Consults  [email or web address]   Login: [employer] Ms. Wellington is a 53F with a PMH of Ebstein Anomaly s/p tricuspid valve annuloplasty, Bidirectional Hi procedure, closure of ASD, and AFL Ablation 11/2002 presenting with dizziness x 3 days.     ECG: AFib with RBBB and possible LPFB    1. Dizziness  2. AF - CHADSVASC = 2 s/p AFL ablation 2002  3. Ebstein Anomaly s/p tricuspid valve annuloplasty, bidirectional Hi procedure, closure of ASD      RECOMMENDATIONS:  - F/u CTA H/N and CTH  - Agree with starting DOAc such as Eliquis  - Currently rate controlled AFib not on rate control therapy  - Would make NPO after MN tentatively for TERESA/DCCV  - Agree with TTE  - Check TSH level   - Wishes to have Pediatric Cardiologist Dr. Chente Bansal involved  - Needs outpatient sleep apnea test    - Keep Mg > 2 and K > 4  - Monitor on telemetry    Note not final until signed by attending       Tammy Benitez MD  Cardiology Fellow PGY-6  Phone: [phone]    For all New Consults  [email or web address]   Login: [employer]            Note not final until signed by attending       Tammy Benitez MD  Cardiology Fellow PGY-6  Phone: [phone]    For all New Consults  [email or web address]   Login: [employer] Ms. Wellington is a 53F with a PMH of Ebstein Anomaly s/p tricuspid valve annuloplasty, Bidirectional Hi procedure, closure of ASD, and AFL Ablation 11/2002 presenting with dizziness x 3 days.     ECG: AFib with RBBB and possible LPFB    1. Dizziness  2. AF - CHADSVASC = 2 s/p AFL ablation 2002  3. Ebstein Anomaly s/p tricuspid valve annuloplasty, bidirectional Hi procedure, closure of ASD      RECOMMENDATIONS:  - F/u CTA H/N and CTH  - Agree with starting DOAc such as Eliquis  - Currently rate controlled AFib not on rate control therapy  - Would make NPO after MN tentatively for TERESA/DCCV  - Agree with TTE  - Check TSH level   - Wishes to have Pediatric Cardiologist Dr. Chente Bansal involved  - Needs outpatient sleep apnea test    - Keep Mg > 2 and K > 4  - Monitor on telemetry    Note not final until signed by attending       Tammy Benitez MD  Cardiology Fellow PGY-6  Phone: [phone]    For all New Consults  [email or web address]   Login: [employer]            Note not final until signed by attending       Tammy Benitez MD  Cardiology Fellow PGY-6  Phone: [phone]    For all New Consults  [email or web address]   Login: [employer] Ms. Wellington is a 53F with a PMH of Ebstein Anomaly s/p tricuspid valve annuloplasty, Bidirectional Hi procedure, closure of ASD, and AFL Ablation 11/2002 presenting with dizziness x 3 days.     ECG: AFib with RBBB and possible LPFB    1. Dizziness  2. AF - CHADSVASC = 2 s/p AFL ablation 2002  3. Ebstein Anomaly s/p tricuspid valve annuloplasty, bidirectional Hi procedure, closure of ASD      RECOMMENDATIONS:  - F/u CTA H/N and CTH  - Agree with starting DOAc such as Eliquis  - Currently rate controlled AFib not on rate control therapy  - Would make NPO after MN tentatively for TERESA/DCCV  - Agree with TTE  - Check TSH level   - Wishes to have Pediatric Cardiologist Dr. Chente Bansal involved  - Needs outpatient sleep apnea test    - Keep Mg > 2 and K > 4  - Monitor on telemetry    Note not final until signed by attending       Tammy Benitez MD  Cardiology Fellow PGY-6  Phone: [phone]    For all New Consults  [email or web address]   Login: [employer]            Note not final until signed by attending       Tammy Benitez MD  Cardiology Fellow PGY-6  Phone: [phone]    For all New Consults  [email or web address]   Login: [employer] Ms. Wellington is a 53F with a PMH of Ebstein Anomaly s/p tricuspid valve annuloplasty, Bidirectional Hi procedure, closure of ASD, and AFL Ablation 11/2002 presenting with dizziness x 3 days.     ECG: AFib with RBBB and possible LPFB    1. Dizziness  2. AF - CHADSVASC = 2 s/p AFL ablation 2002  3. Ebstein Anomaly s/p tricuspid valve annuloplasty, bidirectional Hi procedure, closure of ASD      RECOMMENDATIONS:  - F/u CTA H/N and CTH  - Agree with starting DOAc such as Eliquis  - Afib currently rate controlled   - Would make NPO after MN tentatively for TERESA/DCCV  - Agree with TTE  - Check TSH level   - Wishes to have Pediatric Cardiologist Dr. Chente Bansal involved  - Needs outpatient sleep apnea test    - Keep Mg > 2 and K > 4  - Monitor on telemetry    Note not final until signed by attending       Tammy Benitez MD  Cardiology Fellow PGY-6  Phone: [phone]    For all New Consults  [email or web address]   Login: [employer]            Note not final until signed by attending       Tammy Benitez MD  Cardiology Fellow PGY-6  Phone: [phone]    For all New Consults  [email or web address]   Login: [employer] Ms. Wellington is a 53F with a PMH of Ebstein Anomaly s/p tricuspid valve annuloplasty, Bidirectional Hi procedure, closure of ASD, and AFL Ablation 11/2002 presenting with dizziness x 3 days.     ECG: AFib with RBBB and possible LPFB    1. Dizziness  2. AF - CHADSVASC = 2 s/p AFL ablation 2002  3. Ebstein Anomaly s/p tricuspid valve annuloplasty, bidirectional Hi procedure, closure of ASD      RECOMMENDATIONS:  - F/u CTA H/N and CTH  - Agree with starting DOAc such as Eliquis  - Afib currently rate controlled   - Would make NPO after MN tentatively for TERESA/DCCV  - Agree with TTE  - Check TSH level   - Wishes to have Pediatric Cardiologist Dr. Chente Basnal involved  - Needs outpatient sleep apnea test    - Keep Mg > 2 and K > 4  - Monitor on telemetry    Note not final until signed by attending       Tammy Benitez MD  Cardiology Fellow PGY-6  Phone: [phone]    For all New Consults  [email or web address]   Login: [employer]            Note not final until signed by attending       Tammy Benitez MD  Cardiology Fellow PGY-6  Phone: [phone]    For all New Consults  [email or web address]   Login: [employer] Ms. Wellington is a 53F with a PMH of Ebstein Anomaly s/p tricuspid valve annuloplasty, Bidirectional Hi procedure, closure of ASD, and AFL Ablation 11/2002 presenting with dizziness x 3 days.     ECG: AFib with RBBB and possible LPFB    1. Dizziness  2. AF - CHADSVASC = 2 s/p AFL ablation 2002  3. Ebstein Anomaly s/p tricuspid valve annuloplasty, bidirectional Hi procedure, closure of ASD      RECOMMENDATIONS:  - F/u CTA H/N and CTH  - Agree with starting DOAc such as Eliquis  - Afib currently rate controlled   - Would make NPO after MN tentatively for TERESA/DCCV  - Agree with TTE  - Check TSH level   - Wishes to have Pediatric Cardiologist Dr. Chente Bansal involved  - Needs outpatient sleep apnea test    - Keep Mg > 2 and K > 4  - Monitor on telemetry    Note not final until signed by attending       Tammy Benitez MD  Cardiology Fellow PGY-6  Phone: [phone]    For all New Consults  [email or web address]   Login: [employer]            Note not final until signed by attending       Tammy Benitez MD  Cardiology Fellow PGY-6  Phone: [phone]    For all New Consults  [email or web address]   Login: [employer]

## 2023-10-23 NOTE — ED ADULT NURSE NOTE - OBJECTIVE STATEMENT
54 y/o F A&Ox4 w/ PMH of mitral valve repairment, a fib (not on blood thinners) presents to ED complaining of dizziness and HTN. Pt endorses dizziness and states her BP is "high for her at 140/70." pt endorses episodes of nausea and vomiting last night and states that she needed help walking today which is not baseline for her. Pt is well appearing and in no acute distress. Pt denies nausea at this time. VSS. Pt placed CM and afib noted.

## 2023-10-23 NOTE — H&P ADULT - ASSESSMENT
The patient is a 53y Female complaining of Dizziness.    A Fib:    Tele  Cardio/EP eval appreciated  TTE  Eliquis  Possible DCCV      Ebstein anomaly:  Pediatric cardio eval

## 2023-10-23 NOTE — ED PROVIDER NOTE - PHYSICAL EXAMINATION
Constitutional: VS reviewed. Alert and orientedx3, well appearing, no apparent distress  HEENT: Atraumatic, EOMI, PERRL, horizontal nystagmus in leftward gaze  CV: RRR  Lungs: Clear and equal bilaterally, no wheezes, rales or crackles  Abdomen: Soft, nondistended, nontender  MSK: No deformities  Skin: Warm and dry. As visualized no rashes, lesions, bruising or erythema  Neuro: Strength 5/5 in all extremities. Sensation intact. No pronator drift. No facial droop.   Lymph: No pitting edema in extremities.

## 2023-10-23 NOTE — H&P ADULT - REASON FOR ADMISSION
The patient is a 53y Female complaining of hypertension. The patient is a 53y Female complaining of Dizziness

## 2023-10-24 ENCOUNTER — TRANSCRIPTION ENCOUNTER (OUTPATIENT)
Age: 53
End: 2023-10-24

## 2023-10-24 VITALS
DIASTOLIC BLOOD PRESSURE: 68 MMHG | OXYGEN SATURATION: 95 % | TEMPERATURE: 98 F | SYSTOLIC BLOOD PRESSURE: 107 MMHG | RESPIRATION RATE: 17 BRPM | HEART RATE: 57 BPM

## 2023-10-24 LAB
ANION GAP SERPL CALC-SCNC: 7 MMOL/L — SIGNIFICANT CHANGE UP (ref 5–17)
ANION GAP SERPL CALC-SCNC: 7 MMOL/L — SIGNIFICANT CHANGE UP (ref 5–17)
BUN SERPL-MCNC: 18 MG/DL — SIGNIFICANT CHANGE UP (ref 7–23)
BUN SERPL-MCNC: 18 MG/DL — SIGNIFICANT CHANGE UP (ref 7–23)
CALCIUM SERPL-MCNC: 10.3 MG/DL — SIGNIFICANT CHANGE UP (ref 8.4–10.5)
CALCIUM SERPL-MCNC: 10.3 MG/DL — SIGNIFICANT CHANGE UP (ref 8.4–10.5)
CHLORIDE SERPL-SCNC: 107 MMOL/L — SIGNIFICANT CHANGE UP (ref 96–108)
CHLORIDE SERPL-SCNC: 107 MMOL/L — SIGNIFICANT CHANGE UP (ref 96–108)
CHOLEST SERPL-MCNC: 130 MG/DL — SIGNIFICANT CHANGE UP
CHOLEST SERPL-MCNC: 130 MG/DL — SIGNIFICANT CHANGE UP
CO2 SERPL-SCNC: 27 MMOL/L — SIGNIFICANT CHANGE UP (ref 22–31)
CO2 SERPL-SCNC: 27 MMOL/L — SIGNIFICANT CHANGE UP (ref 22–31)
CREAT SERPL-MCNC: 0.74 MG/DL — SIGNIFICANT CHANGE UP (ref 0.5–1.3)
CREAT SERPL-MCNC: 0.74 MG/DL — SIGNIFICANT CHANGE UP (ref 0.5–1.3)
EGFR: 97 ML/MIN/1.73M2 — SIGNIFICANT CHANGE UP
EGFR: 97 ML/MIN/1.73M2 — SIGNIFICANT CHANGE UP
GLUCOSE SERPL-MCNC: 97 MG/DL — SIGNIFICANT CHANGE UP (ref 70–99)
GLUCOSE SERPL-MCNC: 97 MG/DL — SIGNIFICANT CHANGE UP (ref 70–99)
HCG UR QL: NEGATIVE — SIGNIFICANT CHANGE UP
HCG UR QL: NEGATIVE — SIGNIFICANT CHANGE UP
HDLC SERPL-MCNC: 54 MG/DL — SIGNIFICANT CHANGE UP
HDLC SERPL-MCNC: 54 MG/DL — SIGNIFICANT CHANGE UP
LIPID PNL WITH DIRECT LDL SERPL: 61 MG/DL — SIGNIFICANT CHANGE UP
LIPID PNL WITH DIRECT LDL SERPL: 61 MG/DL — SIGNIFICANT CHANGE UP
MAGNESIUM SERPL-MCNC: 2.2 MG/DL — SIGNIFICANT CHANGE UP (ref 1.6–2.6)
MAGNESIUM SERPL-MCNC: 2.2 MG/DL — SIGNIFICANT CHANGE UP (ref 1.6–2.6)
NON HDL CHOLESTEROL: 76 MG/DL — SIGNIFICANT CHANGE UP
NON HDL CHOLESTEROL: 76 MG/DL — SIGNIFICANT CHANGE UP
PHOSPHATE SERPL-MCNC: 4.1 MG/DL — SIGNIFICANT CHANGE UP (ref 2.5–4.5)
PHOSPHATE SERPL-MCNC: 4.1 MG/DL — SIGNIFICANT CHANGE UP (ref 2.5–4.5)
POTASSIUM SERPL-MCNC: 4.5 MMOL/L — SIGNIFICANT CHANGE UP (ref 3.5–5.3)
POTASSIUM SERPL-MCNC: 4.5 MMOL/L — SIGNIFICANT CHANGE UP (ref 3.5–5.3)
POTASSIUM SERPL-SCNC: 4.5 MMOL/L — SIGNIFICANT CHANGE UP (ref 3.5–5.3)
POTASSIUM SERPL-SCNC: 4.5 MMOL/L — SIGNIFICANT CHANGE UP (ref 3.5–5.3)
SODIUM SERPL-SCNC: 141 MMOL/L — SIGNIFICANT CHANGE UP (ref 135–145)
SODIUM SERPL-SCNC: 141 MMOL/L — SIGNIFICANT CHANGE UP (ref 135–145)
TRIGL SERPL-MCNC: 76 MG/DL — SIGNIFICANT CHANGE UP
TRIGL SERPL-MCNC: 76 MG/DL — SIGNIFICANT CHANGE UP

## 2023-10-24 PROCEDURE — 71045 X-RAY EXAM CHEST 1 VIEW: CPT

## 2023-10-24 PROCEDURE — 99253 IP/OBS CNSLTJ NEW/EST LOW 45: CPT

## 2023-10-24 PROCEDURE — 99233 SBSQ HOSP IP/OBS HIGH 50: CPT

## 2023-10-24 PROCEDURE — 70496 CT ANGIOGRAPHY HEAD: CPT | Mod: MA

## 2023-10-24 PROCEDURE — 70450 CT HEAD/BRAIN W/O DYE: CPT | Mod: MA

## 2023-10-24 PROCEDURE — 76377 3D RENDER W/INTRP POSTPROCES: CPT | Mod: 26

## 2023-10-24 PROCEDURE — 93315 ECHO TRANSESOPHAGEAL: CPT

## 2023-10-24 PROCEDURE — 80048 BASIC METABOLIC PNL TOTAL CA: CPT

## 2023-10-24 PROCEDURE — 99285 EMERGENCY DEPT VISIT HI MDM: CPT

## 2023-10-24 PROCEDURE — 84484 ASSAY OF TROPONIN QUANT: CPT

## 2023-10-24 PROCEDURE — 92960 CARDIOVERSION ELECTRIC EXT: CPT

## 2023-10-24 PROCEDURE — 84100 ASSAY OF PHOSPHORUS: CPT

## 2023-10-24 PROCEDURE — 76377 3D RENDER W/INTRP POSTPROCES: CPT

## 2023-10-24 PROCEDURE — 83735 ASSAY OF MAGNESIUM: CPT

## 2023-10-24 PROCEDURE — 80061 LIPID PANEL: CPT

## 2023-10-24 PROCEDURE — 85610 PROTHROMBIN TIME: CPT

## 2023-10-24 PROCEDURE — 93005 ELECTROCARDIOGRAM TRACING: CPT

## 2023-10-24 PROCEDURE — 83880 ASSAY OF NATRIURETIC PEPTIDE: CPT

## 2023-10-24 PROCEDURE — 80053 COMPREHEN METABOLIC PANEL: CPT

## 2023-10-24 PROCEDURE — 81025 URINE PREGNANCY TEST: CPT

## 2023-10-24 PROCEDURE — 36415 COLL VENOUS BLD VENIPUNCTURE: CPT

## 2023-10-24 PROCEDURE — 99231 SBSQ HOSP IP/OBS SF/LOW 25: CPT

## 2023-10-24 PROCEDURE — 85730 THROMBOPLASTIN TIME PARTIAL: CPT

## 2023-10-24 PROCEDURE — 93315 ECHO TRANSESOPHAGEAL: CPT | Mod: 26

## 2023-10-24 PROCEDURE — 93010 ELECTROCARDIOGRAM REPORT: CPT | Mod: 76,59

## 2023-10-24 PROCEDURE — 85025 COMPLETE CBC W/AUTO DIFF WBC: CPT

## 2023-10-24 PROCEDURE — 70498 CT ANGIOGRAPHY NECK: CPT | Mod: MA

## 2023-10-24 RX ORDER — APIXABAN 2.5 MG/1
1 TABLET, FILM COATED ORAL
Qty: 0 | Refills: 0 | DISCHARGE
Start: 2023-10-24

## 2023-10-24 RX ORDER — METOPROLOL TARTRATE 50 MG
1 TABLET ORAL
Qty: 30 | Refills: 0
Start: 2023-10-24 | End: 2023-11-22

## 2023-10-24 RX ORDER — METOPROLOL TARTRATE 50 MG
25 TABLET ORAL DAILY
Refills: 0 | Status: DISCONTINUED | OUTPATIENT
Start: 2023-10-24 | End: 2023-10-24

## 2023-10-24 RX ORDER — INFLUENZA VIRUS VACCINE 15; 15; 15; 15 UG/.5ML; UG/.5ML; UG/.5ML; UG/.5ML
0.5 SUSPENSION INTRAMUSCULAR ONCE
Refills: 0 | Status: DISCONTINUED | OUTPATIENT
Start: 2023-10-24 | End: 2023-10-24

## 2023-10-24 RX ORDER — APIXABAN 2.5 MG/1
1 TABLET, FILM COATED ORAL
Qty: 60 | Refills: 3
Start: 2023-10-24 | End: 2024-02-20

## 2023-10-24 RX ADMIN — APIXABAN 5 MILLIGRAM(S): 2.5 TABLET, FILM COATED ORAL at 08:12

## 2023-10-24 RX ADMIN — Medication 25 MILLIGRAM(S): at 17:04

## 2023-10-24 NOTE — PROGRESS NOTE ADULT - REASON FOR ADMISSION
The patient is a 53y Female complaining of Dizziness
The patient is a 53y Female complaining of Dizziness

## 2023-10-24 NOTE — DISCHARGE NOTE PROVIDER - HOSPITAL COURSE
52 y/o F with PMHx of HTN, afib s/p ablation (not on AC), mitral valve repair, Ebstein abnormality presents to the ED for 3 days of dizziness. Pt states she has had intermittent sensation of room spinning over the last 3 days. Worse at night and with laying flat. Pt also endorses associated nausea and 1 episode of vomiting. Pt states she took her blood pressure due to these symptoms and it was elevated to 150s/100s. Pt states she was started on BP meds but only took them once a few years ago, states she "drinks tea to lower the blood pressure". Also endorses mild headache. Denies fevers, chills, vision changes, CP, SOB, abd pain, diarrhea, constipation, dysuria, hematuria.  Patient had EKG, CXR,LABS, tele monitoring for arrythmia .    10/24 TERESA/DCCV - Successful , now in NSR .      54 y/o F with PMHx of HTN, afib s/p ablation (not on AC), mitral valve repair, Ebstein abnormality presents to the ED for 3 days of dizziness. Pt states she has had intermittent sensation of room spinning over the last 3 days. Worse at night and with laying flat. Pt also endorses associated nausea and 1 episode of vomiting. Pt states she took her blood pressure due to these symptoms and it was elevated to 150s/100s. Pt states she was started on BP meds but only took them once a few years ago, states she "drinks tea to lower the blood pressure". Also endorses mild headache. Denies fevers, chills, vision changes, CP, SOB, abd pain, diarrhea, constipation, dysuria, hematuria.  Patient had EKG, CXR,LABS, tele monitoring for arrythmia .    10/24 TERESA/DCCV - Successful , now in NSR .   10/24 started toprol XL 25 , discharge home on Zio AT patch  x 2 weeks. She will f/u Dr Quevedo in 2 weeks, Dr Roberts in 4 weeks , presciptions for eliquis and Toprol sent.   Patient stable for dc home as per Dr Roberts and Dr Nails . 79.9

## 2023-10-24 NOTE — PROGRESS NOTE ADULT - SUBJECTIVE AND OBJECTIVE BOX
Patient seen and examined at bedside.    Overnight Events:   - No acute events overnight  - On telemetry AFib 60s-80s as low to 50s  - No further episodes of dizziness  - CT H/N and CTH unremarkable    REVIEW OF SYSTEMS:  General: no fatigue/malaise, weight loss/gain.  Skin: no rashes.  Ophthalmologic: no blurred vision, no loss of vision. 	  ENT: no sore throat, rhinorrhea, sinus congestion.  Respiratory: no SOB, cough or wheeze.  CV: No chest pain. No palpitations.   Gastrointestinal:  no N/V/D, no melena/hematemesis/hematochezia.  Genitourinary: no dysuria/hesitancy or hematuria.  Musculoskeletal: no myalgias or arthralgias.  Neurological: no changes in vision or hearing, no lightheadedness/dizziness, no syncope/near syncope	  Psychiatric: no unusual stress/anxiety.   Hematology/Lymphatics: no unusual bleeding, bruising and no lymphadenopathy.  Endocrine: no unusual thirst.           Current Meds:  apixaban 5 milliGRAM(s) Oral every 12 hours  influenza   Vaccine 0.5 milliLiter(s) IntraMuscular once      Vitals:  T(F): 98.1 (10-24), Max: 98.5 (10-23)  HR: 64 (10-24) (60 - 83)  BP: 119/77 (10-24) (111/63 - 149/91)  RR: 17 (10-24)  SpO2: 100% (10-24)  I&O's Summary      Physical Exam:  Appearance: No acute distress; well appearing  Eyes: PERRL, EOMI, pink conjunctiva  HEENT: Normal oral mucosa  Cardiovascular: RRR, S1, S2, no murmurs, rubs, or gallops; no edema; no JVD  Respiratory: Clear to auscultation bilaterally  Gastrointestinal: soft, non-tender, non-distended with normal bowel sounds  Musculoskeletal: No clubbing; no joint deformity   Neurologic: Non-focal  Lymphatic: No lymphadenopathy  Psychiatry: AAOx3, mood & affect appropriate  Skin: No rashes, ecchymoses, or cyanosis                          12.5   4.07  )-----------( 144      ( 23 Oct 2023 10:54 )             38.6     10-24    141  |  107  |  18  ----------------------------<  97  4.5   |  27  |  0.74    Ca    10.3      24 Oct 2023 00:01  Phos  4.1     10-24  Mg     2.2     10-24    TPro  7.5  /  Alb  4.6  /  TBili  1.0  /  DBili  x   /  AST  29  /  ALT  23  /  AlkPhos  100  10-23    PT/INR - ( 23 Oct 2023 11:20 )   PT: 12.5 sec;   INR: 1.14 ratio         PTT - ( 23 Oct 2023 11:20 )  PTT:38.1 sec  CARDIAC MARKERS ( 23 Oct 2023 10:54 )  7 ng/L / x     / x     / x     / x     / x                  
Overnight Events: Seen with [facility] specialist Dr. Quevedo. She denies chest pain or SOB at this time. She is awaiting TERESA.     Telemetry: Afib with RBBB    Review Of Systems: No chest pain, shortness of breath, or palpitations  CONSTITUTIONAL: no fevers or chills  EYES/ENT: No visual changes;  No vertigo or throat pain   NECK: No pain or stiffness  RESPIRATORY: No cough, wheezing. No shortness of breath  CARDIOVASCULAR: No chest pain or palpitations  GASTROINTESTINAL: No abdominal or epigastric pain. No nausea, vomiting. No diarrhea. No melena.  GENITOURINARY: No dysuria, frequency or hematuria  NEUROLOGICAL: No numbness or weakness  SKIN: No itching, burning, rashes, or lesions   All other review of systems is negative unless indicated above.     Current Meds:  apixaban 5 milliGRAM(s) Oral every 12 hours  influenza   Vaccine 0.5 milliLiter(s) IntraMuscular once    Vitals:  T(F): 98.1 (10-), Max: 98.1 (10-)  HR: 64 (10-) (60 - 83)  BP: 119/77 (10-) (111/63 - 133/88)  RR: 17 (10-)  SpO2: 100% (10-)  I&O's Summary    Physical Exam:  Appearance: No acute distress; well appearing  Eyes: PERRL, EOMI, pink conjunctiva  HEENT: Normal oral mucosa  Cardiovascular: Irregularly irregular, S1, S2, no murmurs, rubs, or gallops; no JVD  Respiratory: Clear to auscultation bilaterally  Gastrointestinal: soft, non-tender, non-distended with normal bowel sounds  Extremities: No edema  Musculoskeletal: No clubbing; no joint deformity   Neurologic: Non-focal  Lymphatic: No lymphadenopathy  Psychiatry: AAOx3, mood & affect appropriate  Skin: No rashes, ecchymoses, or cyanosis                          12.5   4.07  )-----------( 144      ( 23 Oct 2023 10:54 )             38.6     10-24    141  |  107  |  18  ----------------------------<  97  4.5   |  27  |  0.74    Ca    10.3      24 Oct 2023 00:01  Phos  4.1     10-24  Mg     2.2     10-24    TPro  7.5  /  Alb  4.6  /  TBili  1.0  /  DBili  x   /  AST  29  /  ALT  23  /  AlkPhos  100  10-    PT/INR - ( 23 Oct 2023 11:20 )   PT: 12.5 sec;   INR: 1.14 ratio         PTT - ( 23 Oct 2023 11:20 )  PTT:38.1 sec  CARDIAC MARKERS ( 23 Oct 2023 10:54 )  7 ng/L / x     / x     / x     / x     / x          Total Cholesterol: 130  LDL: --  HDL: 54  T    Echo:  _______________________________________________________________________________________     CONCLUSIONS:      1. Left ventricular cavity is small. Left ventricular systolic function is normal with an ejection fraction of 64% by 3D.   2. Severely enlarged right ventricular cavity size and reduced systolic function. Tricuspid annular plane systolic excursion (TAPSE) is 1.6 cm (normal >=1.7 cm).   3. Atrialized right ventricle.   4. The right atrium is severely dilated insize.   5. No pericardial effusion seen.   6. Patient with known Ebstein's anomaly of the tricuspid valve.   7. Compared to the transthoracic echocardiogram performed on 2022 there have been no significant interval changes.   8. The inferior venacava is dilated measuring 3.60 cm in diameter, (dilated >2.1cm) with abnormal inspiratory collapse (abnormal <50%) consistent with elevated right atrial pressure (~15, range 10-20mmHg).   9. There is Ebstein's anomaly.    ________________________________________________________________________________________

## 2023-10-24 NOTE — PROGRESS NOTE ADULT - ASSESSMENT
Ms. Wellington is a 53F with a PMH of Ebstein Anomaly s/p tricuspid valve annuloplasty, Bidirectional Hi procedure, closure of ASD, and AFL Ablation 11/2002 presenting with dizziness x 3 days.     ECG: AFib with RBBB and possible LPFB    1. Dizziness  2. AF - CHADSVASC = 2 s/p AFL ablation 2002  3. Ebstein Anomaly s/p tricuspid valve annuloplasty, bidirectional Hi procedure, closure of ASD      RECOMMENDATIONS:  - Continue Eliquis 5mg Q12  - Afib currently rate controlled   - Awaiting TTE  - Plan for TERESA/DCCV currently NPO however patient wishes to speak with Pediatric Cardiologist Dr. Chente Bansal first   - Check TSH level   - Needs outpatient sleep apnea test    - Keep Mg > 2 and K > 4  - Monitor on telemetry    Note not final until signed by attending       Tammy Benitez MD  Cardiology Fellow PGY-6  Phone: [phone]    For all New Consults  [email or web address]   Login: hayes   Ms. Wellington is a 53F with a PMH of Ebstein Anomaly s/p tricuspid valve annuloplasty, Bidirectional Hi procedure, closure of ASD, and AFL Ablation 11/2002 presenting with dizziness x 3 days.     ECG: AFib with RBBB and possible LPFB    1. Dizziness  2. AF - CHADSVASC = 2 s/p AFL ablation 2002  3. Ebstein Anomaly s/p tricuspid valve annuloplasty, bidirectional Hi procedure, closure of ASD      RECOMMENDATIONS:  - Continue Eliquis 5mg Q12  - Afib currently rate controlled   - Awaiting TTE  - Plan for TERESA/DCCV currently NPO however patient wishes to speak with Pediatric Cardiologist Dr. Chente Bansal first   - Check TSH level   - Needs outpatient sleep apnea test    - Keep Mg > 2 and K > 4  - Monitor on telemetry    Note not final until signed by attending       Tammy Benitez MD  Cardiology Fellow PGY-6  Phone: [phone]    For all New Consults  [email or web address]   Login: hayes   Ms. Wellington is a 53F with a PMH of Ebstein Anomaly s/p tricuspid valve annuloplasty, Bidirectional Hi procedure, closure of ASD, and AFL Ablation 11/2002 presenting with dizziness x 3 days.     ECG: AFib with RBBB and possible LPFB    1. Dizziness  2. AF - CHADSVASC = 2 s/p AFL ablation 2002  3. Ebstein Anomaly s/p tricuspid valve annuloplasty, bidirectional Hi procedure, closure of ASD      RECOMMENDATIONS:  - Continue Eliquis 5mg Q12  - Afib currently rate controlled   - Awaiting TTE  - Plan for TERESA/DCCV currently NPO however patient wishes to speak with Pediatric Cardiologist Dr. Chente Bansal first   - Check TSH level   - Needs outpatient sleep apnea test    - Keep Mg > 2 and K > 4  - Monitor on telemetry    Note not final until signed by attending       Tammy Benitez MD  Cardiology Fellow PGY-6  Phone: [phone]    For all New Consults  [email or web address]   Login: hayes   Ms. Wellington is a 53F with a PMH of Ebstein Anomaly s/p tricuspid valve annuloplasty, Bidirectional Hi procedure, closure of ASD, and AFL Ablation 11/2002 presenting with dizziness x 3 days.     ECG: AFib with RBBB and possible LPFB    1. Dizziness  2. AF - CHADSVASC = 2 s/p AFL ablation 2002  3. Ebstein Anomaly s/p tricuspid valve annuloplasty, bidirectional Hi procedure, closure of ASD      RECOMMENDATIONS:  - Continue Eliquis 5mg Q12 s/p TERESA/DCCV today  - Start Toprol 25mg x 1 now with plan to discharge on Toprol 25mg daily  - Will need 2 week Biotel AT  - Needs outpatient sleep apnea test    - Refrain from alcohol use and minimize caffeine use   - f/u with PCP and [facility] with Dr. Quevedo  - f/u with Dr. Roberts in 4-6 weeks will discuss possible ablation in the future      Note not final until signed by attending       Tammy Benitez MD  Cardiology Fellow PGY-6  Phone: [phone]    For all New Consults  [email or web address]   Login: [employer]   Ms. Wellington is a 53F with a PMH of Ebstein Anomaly s/p tricuspid valve annuloplasty, Bidirectional Hi procedure, closure of ASD, and AFL Ablation 11/2002 presenting with dizziness x 3 days.     ECG: AFib with RBBB and possible LPFB    1. Dizziness  2. AF - CHADSVASC = 2 s/p AFL ablation 2002  3. Ebstein Anomaly s/p tricuspid valve annuloplasty, bidirectional Hi procedure, closure of ASD      RECOMMENDATIONS:  - Continue Eliquis 5mg Q12 s/p TERESA/DCCV today  - Start Toprol 25mg x 1 now with plan to discharge on Toprol 25mg daily  - Will need 2 week Biotel AT  - Needs outpatient sleep apnea test    - Refrain from alcohol use and minimize caffeine use   - f/u with PCP and [facility] with Dr. Quevedo  - f/u with Dr. Roberts in 4-6 weeks will discuss possible ablation in the future      Note not final until signed by attending       Tammy Benitez MD  Cardiology Fellow PGY-6  Phone: [phone]    For all New Consults  [email or web address]   Login: [employer]   Ms. Wellington is a 53F with a PMH of Ebstein Anomaly s/p tricuspid valve annuloplasty, Bidirectional Hi procedure, closure of ASD, and AFL Ablation 11/2002 presenting with dizziness x 3 days.     ECG: AFib with RBBB and possible LPFB    1. Dizziness  2. AF - CHADSVASC = 2 s/p AFL ablation 2002  3. Ebstein Anomaly s/p tricuspid valve annuloplasty, bidirectional Hi procedure, closure of ASD      RECOMMENDATIONS:  - Continue Eliquis 5mg Q12 s/p TERESA/DCCV today  - Start Toprol 25mg x 1 now with plan to discharge on Toprol 25mg daily  - Will need 2 week Biotel AT  - Needs outpatient sleep apnea test    - Refrain from alcohol use and minimize caffeine use   - f/u with PCP and [facility] with Dr. Quevedo  - f/u with Dr. Roberts in 4-6 weeks will discuss possible ablation in the future      Note not final until signed by attending       Tammy Benitez MD  Cardiology Fellow PGY-6  Phone: [phone]    For all New Consults  [email or web address]   Login: Mai[employer]

## 2023-10-24 NOTE — DISCHARGE NOTE PROVIDER - CARE PROVIDER_API CALL
Teri Quevedo  Cardiovascular Disease  [address] [place] [id number]  Phone: [phone]  Fax: [phone]  Follow Up Time: 2 weeks    Deshawn Roberts  Cardiac Electrophysiology  [address] [place] [id number]  Phone: [phone]  Fax: [phone]  Established Patient  Follow Up Time: 1 month

## 2023-10-24 NOTE — DISCHARGE NOTE NURSING/CASE MANAGEMENT/SOCIAL WORK - PATIENT PORTAL LINK FT
You can access the FollowMyHealth Patient Portal offered by [facility] by registering at the following website: [email or web address]. By joining [facility]’s FollowMyHealth portal, you will also be able to view your health information using other applications (apps) compatible with our system.

## 2023-10-24 NOTE — DISCHARGE NOTE PROVIDER - NSDCCPCAREPLAN_GEN_ALL_CORE_FT
PRINCIPAL DISCHARGE DIAGNOSIS  Diagnosis: Atrial fibrillation  Assessment and Plan of Treatment: Patient had EKG, CXR,LABS, tele monitoring for arrythmia . She had a TERESA/DCCV on 10/24. Patient started on eliquis 10/23 .

## 2023-10-24 NOTE — CONSULT NOTE PEDS - SUBJECTIVE AND OBJECTIVE BOX
CHIEF COMPLAINT: hypertension    HISTORY OF PRESENT ILLNESS: 52 y/o F with PMHx of HTN, Ebstein's anomaly, s/p tricuspid valve annuloplasty, right atrial plication, right atrial maze, intraoperative atrial arrhythmia ablation, patch closure of secundum ASD and BDG, presents to the ED for 3 days of dizziness. Pt states she has had intermittent sensation of room spinning over the last 3 days. Worse at night and with laying flat. Pt also endorses associated nausea and 1 episode of vomiting. Pt states she took her blood pressure due to these symptoms and it was elevated to 150s/100s. Pt states she was started on BP meds but only took them once a few years ago, states she "drinks tea to lower the blood pressure". Also endorses mild headache. Denies fevers, chills, vision changes, CP, SOB, abd pain, diarrhea, constipation, dysuria, hematuria.     Review of Systems:  · General-negative  · Respiratory and Thorax- negative  · Cardiovascular	negative  · Musculoskeletal- negative  · Neurological negative      Allergies:  tetracycline: Drug, Rash  erythromycin: Drug, Unknown  penicillin: Drug, Unknown      MEDICATIONS:  apixaban 5 milliGRAM(s) Oral every 12 hours  influenza   Vaccine 0.5 milliLiter(s) IntraMuscular once    .    PHYSICAL EXAMINATION:  Vital signs - Weight (kg): 76.7 (10-24 @ 11:38)  T(C): 36.7 (10-24-23 @ 12:40), Max: 36.7 (10-23-23 @ 19:07)  HR: 65 (10-24-23 @ 12:45) (60 - 83)  BP: 112/61 (10-24-23 @ 12:45) (111/63 - 133/88)    RR: 18 (10-24-23 @ 12:45) (17 - 18)  SpO2: 98% (10-24-23 @ 12:45) (98% - 100%)    General -  in no distress.  Pulmonary - lungs clear to auscultation bilaterally, no wheezes, no rales.  Cardiovascular - normal rate, regular rhythm, normal S1 & S2, no murmurs,   Gastrointestinal - soft, non-distended, non-tender, no hepatosplenomegaly (liver palpable *cm below right costal margin).  Musculoskeletal - no joint swelling, no clubbing, no edema.                              12.5  CBC:   4.07 )-----------( 144   (10-23-23 @ 10:54)                          38.6               141   |  107   |  18                 Ca: 10.3   BMP:   ----------------------------< 97     M.2   (10-24-23 @ 00:01)             4.5    |  27    | 0.74               Ph: 4.1      LFT:     TPro: 7.5 / Alb: 4.6 / TBili: 1.0 / DBili: x / AST: 29 / ALT: 23 / AlkPhos: 100   (10-23-23 @ 10:54)    COAG: PT: 12.5 / PTT: 38.1 / INR: 1.14   (10-23-23 @ 11:20)       IMAGING STUDIES:  Electrocardiogram - (10/24/2023)   ECG: AFib with RBBB and possible LPFB      Chest x-ray - (10/24/2023)   INTERPRETATION:  Marked cardiomegaly consistent with known Ebstein   anomaly. Clear lungs.    ECHO (10/24/2023)  CONCLUSIONS:   1. Left ventricular cavity is small. Left ventricular systolic function is normal with an ejection fraction of 64 % by 3D.   2. Severely enlarged right ventricular cavity size and reduced systolic function. Tricuspid annular plane systolic excursion (TAPSE) is 1.6 cm (normal >=1.7 cm).   3. Atrialized right ventricle.   4. The right atrium is severely dilated in size.   5. No pericardial effusion seen.   6. Patient with known Ebstein's anomaly of the tricuspid valve.   7. Compared to the transthoracic echocardiogram performed on 2022 there have been no significant interval changes.   8. The inferior vena cava is dilated measuring 3.60 cm in diameter, (dilated >2.1cm) with abnormal inspiratory collapse (abnormal <50%) consistent with elevated right atrial pressure (~15, range 10-20mmHg).   9. There is Ebstein's anomaly.    Other - (10/24/2023)   IMPRESSION:    Brain CT: No acute hemorrhage, mass effect or extra-axial collections.    Neck CTA: No hemodynamically significant stenosis.    Brain CTA: No large vessel occlusion or high-grade stenosis CHIEF COMPLAINT: hypertension    HISTORY OF PRESENT ILLNESS: 52 y/o F with PMHx of HTN, Ebstein's anomaly, s/p tricuspid valve annuloplasty, right atrial plication, right atrial maze, intraoperative atrial arrhythmia ablation, patch closure of secundum ASD and BDG, presents to the ED for 3 days of dizziness. Pt states she has had intermittent sensation of room spinning over the last 3 days. Worse at night and with laying flat. Pt also endorses associated nausea and 1 episode of vomiting. Pt states she took her blood pressure due to these symptoms and it was elevated to 150s/100s. Pt states she was started on BP meds but only took them once a few years ago, states she "drinks tea to lower the blood pressure". Also endorses mild headache. Denies fevers, chills, vision changes, CP, SOB, abd pain, diarrhea, constipation, dysuria, hematuria.     Review of Systems:  · General-negative  · Respiratory and Thorax- negative  · Musculoskeletal- negative  · Neurological negative      Allergies:  tetracycline: Drug, Rash  erythromycin: Drug, Unknown  penicillin: Drug, Unknown      MEDICATIONS:  apixaban 5 milliGRAM(s) Oral every 12 hours  influenza   Vaccine 0.5 milliLiter(s) IntraMuscular once    .    PHYSICAL EXAMINATION:  Vital signs - Weight (kg): 76.7 (10-24 @ 11:38)  T(C): 36.7 (10-24-23 @ 12:40), Max: 36.7 (10-23-23 @ 19:07)  HR: 65 (10-24-23 @ 12:45) (60 - 83)  BP: 112/61 (10-24-23 @ 12:45) (111/63 - 133/88)    RR: 18 (10-24-23 @ 12:45) (17 - 18)  SpO2: 98% (10-24-23 @ 12:45) (98% - 100%)    General -  in no distress.  Pulmonary - lungs clear to auscultation bilaterally, no wheezes, no rales.  Cardiovascular - normal rate, regular rhythm, normal S1 & S2, SM  Gastrointestinal - soft, non-distended, non-tender  Musculoskeletal - no joint swelling, no clubbing, no edema.                              12.5  CBC:   4.07 )-----------( 144   (10-23-23 @ 10:54)                          38.6               141   |  107   |  18                 Ca: 10.3   BMP:   ----------------------------< 97     M.2   (10-24-23 @ 00:01)             4.5    |  27    | 0.74               Ph: 4.1      LFT:     TPro: 7.5 / Alb: 4.6 / TBili: 1.0 / DBili: x / AST: 29 / ALT: 23 / AlkPhos: 100   (10-23-23 @ 10:54)    COAG: PT: 12.5 / PTT: 38.1 / INR: 1.14   (10-23-23 @ 11:20)       IMAGING STUDIES:  Electrocardiogram - (10/24/2023)   ECG: AFib with RBBB and possible LPFB      Chest x-ray - (10/24/2023)   INTERPRETATION:  Marked cardiomegaly consistent with known Ebstein   anomaly. Clear lungs.    ECHO (10/24/2023)  CONCLUSIONS:   1. Left ventricular cavity is small. Left ventricular systolic function is normal with an ejection fraction of 64 % by 3D.   2. Severely enlarged right ventricular cavity size and reduced systolic function. Tricuspid annular plane systolic excursion (TAPSE) is 1.6 cm (normal >=1.7 cm).   3. Atrialized right ventricle.   4. The right atrium is severely dilated in size.   5. No pericardial effusion seen.   6. Patient with known Ebstein's anomaly of the tricuspid valve.   7. Compared to the transthoracic echocardiogram performed on 2022 there have been no significant interval changes.   8. The inferior vena cava is dilated measuring 3.60 cm in diameter, (dilated >2.1cm) with abnormal inspiratory collapse (abnormal <50%) consistent with elevated right atrial pressure (~15, range 10-20mmHg).   9. There is Ebstein's anomaly.    Other - (10/24/2023)   IMPRESSION:    Brain CT: No acute hemorrhage, mass effect or extra-axial collections.    Neck CTA: No hemodynamically significant stenosis.    Brain CTA: No large vessel occlusion or high-grade stenosis CHIEF COMPLAINT: hypertension    HISTORY OF PRESENT ILLNESS: 54 y/o F with PMHx of HTN, Ebstein's anomaly, s/p tricuspid valve annuloplasty, right atrial plication, right atrial maze, intraoperative atrial arrhythmia ablation, patch closure of secundum ASD and BDG, presents to the ED for 3 days of dizziness. Pt states she has had intermittent sensation of room spinning over the last 3 days. Worse at night and with laying flat. Pt also endorses associated nausea and 1 episode of vomiting. Pt states she took her blood pressure due to these symptoms and it was elevated to 150s/100s. Pt states she was started on BP meds but only took them once a few years ago, states she "drinks tea to lower the blood pressure". Also endorses mild headache. Denies fevers, chills, vision changes, CP, SOB, abd pain, diarrhea, constipation, dysuria, hematuria.     Review of Systems:  · General-negative  · Respiratory and Thorax- negative  · Musculoskeletal- negative  · Neurological negative      Allergies:  tetracycline: Drug, Rash  erythromycin: Drug, Unknown  penicillin: Drug, Unknown      MEDICATIONS:  apixaban 5 milliGRAM(s) Oral every 12 hours  influenza   Vaccine 0.5 milliLiter(s) IntraMuscular once    .    PHYSICAL EXAMINATION:  Vital signs - Weight (kg): 76.7 (10-24 @ 11:38)  T(C): 36.7 (10-24-23 @ 12:40), Max: 36.7 (10-23-23 @ 19:07)  HR: 65 (10-24-23 @ 12:45) (60 - 83)  BP: 112/61 (10-24-23 @ 12:45) (111/63 - 133/88)    RR: 18 (10-24-23 @ 12:45) (17 - 18)  SpO2: 98% (10-24-23 @ 12:45) (98% - 100%)    General -  in no distress.  Pulmonary - lungs clear to auscultation bilaterally, no wheezes, no rales.  Cardiovascular - normal rate, regular rhythm, S1 & S2, SM  Gastrointestinal - soft, non-distended, non-tender  Musculoskeletal - no joint swelling, no clubbing, no edema.                              12.5  CBC:   4.07 )-----------( 144   (10-23-23 @ 10:54)                          38.6               141   |  107   |  18                 Ca: 10.3   BMP:   ----------------------------< 97     M.2   (10-24-23 @ 00:01)             4.5    |  27    | 0.74               Ph: 4.1      LFT:     TPro: 7.5 / Alb: 4.6 / TBili: 1.0 / DBili: x / AST: 29 / ALT: 23 / AlkPhos: 100   (10-23-23 @ 10:54)    COAG: PT: 12.5 / PTT: 38.1 / INR: 1.14   (10-23-23 @ 11:20)       IMAGING STUDIES:  Electrocardiogram - (10/24/2023)   ECG: AFib with RBBB and possible LPFB      Chest x-ray - (10/24/2023)   INTERPRETATION:  Marked cardiomegaly consistent with known Ebstein   anomaly. Clear lungs.    ECHO (10/24/2023)  CONCLUSIONS:   1. Left ventricular cavity is small. Left ventricular systolic function is normal with an ejection fraction of 64 % by 3D.   2. Severely enlarged right ventricular cavity size and reduced systolic function. Tricuspid annular plane systolic excursion (TAPSE) is 1.6 cm (normal >=1.7 cm).   3. Atrialized right ventricle.   4. The right atrium is severely dilated in size.   5. No pericardial effusion seen.   6. Patient with known Ebstein's anomaly of the tricuspid valve. Severe tricuspid regurgitation.    7. Compared to the transthoracic echocardiogram performed on 2022 there have been no significant interval changes.   8. The inferior vena cava is dilated measuring 3.60 cm in diameter, (dilated >2.1cm) with abnormal inspiratory collapse (abnormal <50%) consistent with elevated right atrial pressure (~15, range 10-20mmHg).      Other - (10/24/2023)   IMPRESSION:    Brain CT: No acute hemorrhage, mass effect or extra-axial collections.    Neck CTA: No hemodynamically significant stenosis.    Brain CTA: No large vessel occlusion or high-grade stenosis

## 2023-10-24 NOTE — PROGRESS NOTE ADULT - ASSESSMENT
Assessment and Recommendations:  53 year old F with Ebstein's anomaly s/p b/l Hi, closure of atrial communication, and ablation of atrial flutter in 11/2002 by Dr. Ly (last seen by Dr. Hope 7/22/21) who presents for dizziness x 3 days, found to be in atrial fibrillation.    1) Ebstein's anomaly s/p b/l Hi  2) AFLUT ablation intraop  - She appears grossly euvolemic on exam  - EKG shows Afib with RBBB  - It is unclear if her dizziness is related to the atrial fibrillation  - CTA H and N WNL  - TTE showed severely dilated RA with atrialized RV and Ebstein anomaly  - Continue Eliquis 5mg BID  - TERSEA/DCCV with EP today, f/u EP recs  - She will need close f/u with Dr. Quevedo at the [facility] center    Karl Cortez MD  Cardiology Attending    All Cardiology service information can be found 24/7 on [email or web address], password: [email or web address]

## 2023-10-24 NOTE — PRE PROCEDURE NOTE - PRE PROCEDURE EVALUATION
Pre Procedure Note:    Procedure Service:  Cardiology   Procedure Name: Transesophageal Echo  Pre Procedure Evaluation:    HPI: 53 year old F with history of atrial fibrillation presents for Transesophageal echocardiogram.    RELEVANT PMH/PSH:    Any respiratory problems: Asthma, COPD, HUMPHREY, recent respiratory illness? NO    Any history of Atlantoaxial disease and severe generalized cervical arthritis? NO    Oral/Dental history: Any dentures, removable, loose, broken tooth/teeth, mouth sores? NO    Significant dysphagia and odynophagia? NO    Any GI history of: Esophageal surgeries, strictures, diverticula, masses, varices, diaphragmatic hernia, stomach ulcers, stomach surgeries, bariatric surgery, GI bleed? NO    SOCIAL HISTORY:   [ ] Non-smoker [ ] Smoker [ ] Alcohol  ALLERGIES: No Known Allergies  MEDICATION: REVIEWED  REVIEW OF SYSTEMS:  CONSTITUTIONAL: No fever, weight loss, or fatigue  HEENT: No eye issues, No sinus or throat pain; No pain or stiffness at neck  RESPIRATORY: No cough, wheezing, chills or hemoptysis; Shortness of Breath  CARDIOVASCULAR: No chest pain, palpitations, passing out, dizziness, or leg swelling  GASTROINTESTINAL: No abd pain, nausea, vomiting diarrhea constipation. No melena or hematochezia.  NEUROLOGICAL: No headaches, memory loss, acute change in mental status  MUSCULOSKELETAL: No significant muscle, back, or extremity pain  HEME/LYMPH: No easy bruising, or bleeding gums    PHYSICAL EXAM:  Vital Signs:  BP   119 / 77     HR  64     O2 sat 100% on RA    Height      weight   Appearance: Normal	  HEENT:   Normal oral mucosa, PERRL, EOMI	  Cardiovascular: Normal S1 S2, No JVD, No murmurs, No edema  Respiratory: Lungs clear to auscultation	  Gastrointestinal:  Soft, Non-tender, + BS	  Neurologic/PSY: Non-focal, A&O x 3  Extremities: No clubbing, cyanosis or edema    LABS: reviewed  CARDIAC TESTING/STUDIES:    [ ] ECG  [x ] Echocardiogram:  [ ] Catheterization:  [ ] Stress Test:  	  [ ] PPM/AICD:	    Plan: 	  TERESA Procedure Candidate	YES    Day of Procedure Attestation:  I have personally seen, examined, and participated in the care of this patient. I have reviewed all pertinent information, including history, physical exam, sedation plan, and agree except as noted.    Attestation Statements:  I have personally seen and examined the patient.  I fully participated in the care of this patient.  I have made amendments to the documentation where necessary, and agree with the history, physical exam, and plan as documented by the Fellow.

## 2023-10-24 NOTE — DISCHARGE NOTE PROVIDER - NSDCMRMEDTOKEN_GEN_ALL_CORE_FT
apixaban 5 mg oral tablet: 1 tab(s) orally every 12 hours   apixaban 5 mg oral tablet: 1 tab(s) orally every 12 hours  metoprolol succinate 25 mg oral tablet, extended release: 1 tab(s) orally once a day

## 2023-10-24 NOTE — DISCHARGE NOTE NURSING/CASE MANAGEMENT/SOCIAL WORK - NSDCPEFALRISK_GEN_ALL_CORE
For information on Fall & Injury Prevention, visit: [email or web address] OR  [email or web address] OR  https://www.cdc.gov/steadi/patient.html

## 2023-10-24 NOTE — DISCHARGE NOTE PROVIDER - PROVIDER TOKENS
PROVIDER:[TOKEN:[[id number]],FOLLOWUP:[2 weeks]],PROVIDER:[TOKEN:[[id number]],FOLLOWUP:[1 month],ESTABLISHEDPATIENT:[T]]

## 2023-10-24 NOTE — CONSULT NOTE PEDS - ASSESSMENT
54year old female w/ Ebsteins' anomaly, s/p tricuspid valve annuloplasty, right atrial plication, right atrial maze, intraoperative atrial arrhythmia ablation, patch closure of secundum ASD and BDG, now with atrial fibrillation.    - Continue Eliquis 5mg Q12  - Afib currently rate controlled   - s/p TTE, TERESA and cardioversion  - follow up with [facility] team  -will need outpatient imaging   54year old female w/ Ebsteins' anomaly, s/p tricuspid valve annuloplasty, right atrial plication, right atrial maze, intraoperative atrial arrhythmia ablation, patch closure of secundum ASD and BDG, now with atrial fibrillation, severe TR and dilated and hypokinetic RV.    - Continue Eliquis 5mg Q12  - Afib currently rate controlled   - TERESA and cardioversion  - follow up with [facility] team  - will need outpatient imaging with cMRI

## 2023-10-25 PROBLEM — I48.20 CHRONIC ATRIAL FIBRILLATION, UNSPECIFIED: Chronic | Status: ACTIVE | Noted: 2023-10-23

## 2023-10-25 PROBLEM — Q22.5 EBSTEIN'S ANOMALY: Chronic | Status: ACTIVE | Noted: 2023-10-23

## 2023-10-31 ENCOUNTER — NON-APPOINTMENT (OUTPATIENT)
Age: 53
End: 2023-10-31

## 2023-11-16 ENCOUNTER — APPOINTMENT (OUTPATIENT)
Dept: ELECTROPHYSIOLOGY | Facility: CLINIC | Age: 53
End: 2023-11-16

## 2023-11-20 ENCOUNTER — APPOINTMENT (OUTPATIENT)
Dept: PEDIATRIC CARDIOLOGY | Facility: CLINIC | Age: 53
End: 2023-11-20
Payer: COMMERCIAL

## 2023-11-20 VITALS
OXYGEN SATURATION: 98 % | BODY MASS INDEX: 28.29 KG/M2 | WEIGHT: 173.94 LBS | SYSTOLIC BLOOD PRESSURE: 132 MMHG | HEIGHT: 65.75 IN | RESPIRATION RATE: 18 BRPM | DIASTOLIC BLOOD PRESSURE: 82 MMHG | HEART RATE: 67 BPM

## 2023-11-20 DIAGNOSIS — Q22.5 EBSTEIN'S ANOMALY: ICD-10-CM

## 2023-11-20 DIAGNOSIS — Z98.890 OTHER SPECIFIED POSTPROCEDURAL STATES: ICD-10-CM

## 2023-11-20 DIAGNOSIS — I48.0 PAROXYSMAL ATRIAL FIBRILLATION: ICD-10-CM

## 2023-11-20 PROCEDURE — 93000 ELECTROCARDIOGRAM COMPLETE: CPT

## 2023-11-20 PROCEDURE — 99203 OFFICE O/P NEW LOW 30 MIN: CPT

## 2023-11-20 PROCEDURE — 99204 OFFICE O/P NEW MOD 45 MIN: CPT

## 2023-11-29 PROBLEM — I48.0 AF (PAROXYSMAL ATRIAL FIBRILLATION): Status: ACTIVE | Noted: 2023-11-22

## 2023-11-29 PROBLEM — Z98.890: Status: ACTIVE | Noted: 2023-11-10

## 2023-12-02 ENCOUNTER — TRANSCRIPTION ENCOUNTER (OUTPATIENT)
Age: 53
End: 2023-12-02

## 2023-12-04 DIAGNOSIS — I10 ESSENTIAL (PRIMARY) HYPERTENSION: ICD-10-CM

## 2023-12-04 RX ORDER — METOPROLOL SUCCINATE 25 MG/1
25 TABLET, EXTENDED RELEASE ORAL
Qty: 90 | Refills: 1 | Status: DISCONTINUED | COMMUNITY
Start: 1900-01-01 | End: 2023-12-04

## 2024-02-29 RX ORDER — APIXABAN 5 MG/1
TABLET, FILM COATED ORAL
Qty: 180 | Refills: 3 | Status: DISCONTINUED | COMMUNITY
End: 2024-02-29

## 2024-03-18 ENCOUNTER — APPOINTMENT (OUTPATIENT)
Dept: ELECTROPHYSIOLOGY | Facility: CLINIC | Age: 54
End: 2024-03-18

## 2024-04-07 ENCOUNTER — RESULT CHARGE (OUTPATIENT)
Age: 54
End: 2024-04-07

## 2024-04-08 ENCOUNTER — APPOINTMENT (OUTPATIENT)
Dept: PEDIATRIC CARDIOLOGY | Facility: CLINIC | Age: 54
End: 2024-04-08
Payer: COMMERCIAL

## 2024-04-08 VITALS
OXYGEN SATURATION: 100 % | BODY MASS INDEX: 27.61 KG/M2 | HEART RATE: 80 BPM | SYSTOLIC BLOOD PRESSURE: 125 MMHG | RESPIRATION RATE: 20 BRPM | HEIGHT: 65.75 IN | DIASTOLIC BLOOD PRESSURE: 78 MMHG | WEIGHT: 169.76 LBS

## 2024-04-08 DIAGNOSIS — I31.39 OTHER PERICARDIAL EFFUSION (NONINFLAMMATORY): ICD-10-CM

## 2024-04-08 DIAGNOSIS — J90 PLEURAL EFFUSION, NOT ELSEWHERE CLASSIFIED: ICD-10-CM

## 2024-04-08 PROCEDURE — 93325 DOPPLER ECHO COLOR FLOW MAPG: CPT

## 2024-04-08 PROCEDURE — 93320 DOPPLER ECHO COMPLETE: CPT

## 2024-04-08 PROCEDURE — G2211 COMPLEX E/M VISIT ADD ON: CPT

## 2024-04-08 PROCEDURE — 99214 OFFICE O/P EST MOD 30 MIN: CPT

## 2024-04-08 PROCEDURE — 93303 ECHO TRANSTHORACIC: CPT

## 2024-04-08 RX ORDER — FUROSEMIDE 20 MG/1
20 TABLET ORAL
Refills: 0 | Status: ACTIVE | COMMUNITY

## 2024-04-08 RX ORDER — METOPROLOL SUCCINATE 50 MG/1
50 TABLET, EXTENDED RELEASE ORAL
Qty: 90 | Refills: 3 | Status: DISCONTINUED | COMMUNITY
Start: 2023-12-04 | End: 2024-04-08

## 2024-04-08 RX ORDER — COLCHICINE 0.6 MG/1
0.6 TABLET ORAL
Refills: 0 | Status: ACTIVE | COMMUNITY

## 2024-04-08 RX ORDER — WARFARIN 2 MG/1
2 TABLET ORAL
Refills: 0 | Status: ACTIVE | COMMUNITY

## 2024-04-08 RX ORDER — METOPROLOL TARTRATE 25 MG/1
25 TABLET, FILM COATED ORAL
Refills: 0 | Status: ACTIVE | COMMUNITY

## 2024-04-08 RX ORDER — APIXABAN 5 MG/1
5 TABLET, FILM COATED ORAL
Qty: 60 | Refills: 5 | Status: DISCONTINUED | COMMUNITY
Start: 2024-02-29 | End: 2024-04-08

## 2024-04-08 NOTE — CONSULT LETTER
[Today's Date] : [unfilled] [Name] : Name: [unfilled] [] : : ~~ [Today's Date:] : [unfilled] [Dear  ___:] : Dear Dr. [unfilled]: [Consult] : I had the pleasure of evaluating your patient, [unfilled]. My full evaluation follows. [Consult - Multiple Provider] : Thank you very much for allowing us to participate in the care of this patient. If you have any questions, please do not hesitate to contact us. [Sincerely,] : Sincerely, [DrBandar  ___] : Dr. RONDON [FreeTextEntry4] : Brady NATHAN MD [FreeTextEntry5] : [address] [place] [address], [address] [FreeTextEntry6] : [place]  [address] [FreeTextEnlpt9] : Phone: [phone] [de-identified] : Sol Kee, MSN, CPNP-AC, PC Pediatric Cardiology, Adult Congenital Cardiology [facility] Physician [facility]  Teri Quevedo MD, GERARDO Director, Adult Congenital Heart , High Risk Cardiovascular Obstetrics [facility] Physician [facility]  [address]: [phone] [facility] Office: [phone] [facility] Office: [phone]

## 2024-04-08 NOTE — HISTORY OF PRESENT ILLNESS
[FreeTextEntry1] : We had the pleasure of seeing Pérez Wellington today in the Adult Congenital Heart Program of [facility]. 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   Most recently (3/19/2024) she underwent the following at the [facility]: 1.Tricuspid valve re-repair with modified Cone technique 2. Patch augmentation of cone circumference with triangular shaped piece of autologous pericaridum. 3. Anterolateral and inferomedial eccentric tricuspid annuloplasty 4. Right reduction atrioplasty 5. Cryoablation cavo tricuspid isthmus  She is here today for her first postoperative visit. Since her surgery she notes increased daily energy and exercise tolerance. Her wound is healing well. She has no complaints today and would like to begin cardiac rehab but requires clearance.  Her discharge medications include Warfarin x 3 months, Colchicine x 1 month, Spirinolactone,, Metoprolol and Lasix.  She works as a psychotherapist. She has had 2 successful pregnancies and her children are now grown..

## 2024-04-08 NOTE — REASON FOR VISIT
[Follow-Up] : a follow-up visit for [Patient] : patient [Medical Records] : medical records [Ebstein's Anomaly] : Ebstein's anomaly [FreeTextEntry3] : s/p TV repair with Cone procedure

## 2024-04-08 NOTE — PHYSICAL EXAM
[General Appearance - Alert] : alert [General Appearance - Well Nourished] : well nourished [General Appearance - Well-Appearing] : well appearing [Respiration, Rhythm And Depth] : normal respiratory rhythm and effort [Chest Surgical / Traumatic Scar] : chest incision well healed [Heart Sounds] : normal S1 and S2 [Heart Sounds Click] : no clicks [Nail Clubbing] : no clubbing  or cyanosis of the fingernails [FreeTextEntry5] : decrease breast sounds on L [FreeTextEntry7] : JAYLA LAU

## 2024-04-08 NOTE — CARDIOLOGY SUMMARY
[Today's Date] : [unfilled] [FreeTextEntry1] : atrial fibrillation, ventricular rate 77 bpm, RBBB [FreeTextEntry2] : Summary: 1. 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 2. Now status post tricuspid valve repair with cone procedure (Dr. Hyde, [facility], 3/19/2024). 3. There are two jets of inflow and regurgitation of the tricuspid valve. Moderate residual tricuspid valve regurgitation in two separate jets. There is tricuspid valve stenosis with mean inflow gradient of 5.9 mmHg. 4. Laminar, low velocity Doppler flow pattern seen in the SVC. Hi anastomosis not well visualized. 5. The right pulmonary artery visualized with limited imaging, the left pulmonary artery could not be seen well. 6. Moderately dilated right atrium. 7. No residual interatrial shunt identified. 8. Moderately dilated right ventricle. 9. Qualitatively right ventricular systolic function appears mildly depressed. 10. Antegrade flow across the pulmonary valve without regurgitation. 11. Flattened diastolic position of interventricular septum and paradoxical septal motion of interventricular septum. 12. Normal systolic excursion of the posterior wall of the left ventricle. 13. No pericardial effusion. 14. Small right pleural effusion.

## 2024-04-08 NOTE — DISCUSSION/SUMMARY
[Needs SBE Prophylaxis] : [unfilled]  needs bacterial endocarditis prophylaxis. SBE prophylaxis is indicated for dental and invasive ENT procedures. (Circulation. 2007; 116: 0866-8443) [FreeTextEntry1] : In summary, Pérez is a 54-year-old female with Ebstein's anomaly who is s/p tricuspid valve repair, closure of atrial septal defect, atrial reduction, bidirectional Hi anastomosis and intraoperative radiofrequency ablation November 13, 2002. She is now s/p tricuspid valve re- repair with Cone procedure on 3/19 at the [facility] with Dr. Hyde. She is feeling well. Her echocardiogram demonstrates moderate tricuspid valve insufficiency with 2 separate jets and mild stenosis with a mean gradient of 4-5 mmHg.  PLAN: - obtain chest x-ray (monitor effusion) - continue Warfarin x 3 months, INR checks weekly until INR therapeutic. - to be monitored by PCP - continue Colchicine x 1 month, then discontinue - continue ASA - continue Metoprolol - continue Spironolactone - increase Lasix to daily - may start cardiac rehab 3 times per week, 36 sessions - follow up in 6 weeks, no echo at that visit

## 2024-04-12 ENCOUNTER — APPOINTMENT (OUTPATIENT)
Dept: RADIOLOGY | Facility: IMAGING CENTER | Age: 54
End: 2024-04-12
Payer: COMMERCIAL

## 2024-04-12 ENCOUNTER — OUTPATIENT (OUTPATIENT)
Dept: OUTPATIENT SERVICES | Facility: HOSPITAL | Age: 54
LOS: 1 days | End: 2024-04-12
Payer: COMMERCIAL

## 2024-04-12 DIAGNOSIS — J90 PLEURAL EFFUSION, NOT ELSEWHERE CLASSIFIED: ICD-10-CM

## 2024-04-12 DIAGNOSIS — I31.39 OTHER PERICARDIAL EFFUSION (NONINFLAMMATORY): ICD-10-CM

## 2024-04-12 PROCEDURE — 71046 X-RAY EXAM CHEST 2 VIEWS: CPT | Mod: 26

## 2024-04-12 PROCEDURE — 71046 X-RAY EXAM CHEST 2 VIEWS: CPT

## 2024-04-23 RX ORDER — SPIRONOLACTONE 25 MG/1
25 TABLET ORAL
Qty: 90 | Refills: 3 | Status: ACTIVE | COMMUNITY
Start: 1900-01-01 | End: 1900-01-01

## 2024-05-01 ENCOUNTER — APPOINTMENT (OUTPATIENT)
Dept: PEDIATRIC CARDIOLOGY | Facility: CLINIC | Age: 54
End: 2024-05-01

## 2024-05-16 ENCOUNTER — APPOINTMENT (OUTPATIENT)
Dept: HEART AND VASCULAR | Facility: CLINIC | Age: 54
End: 2024-05-16

## 2025-08-14 ENCOUNTER — APPOINTMENT (OUTPATIENT)
Dept: PEDIATRIC CARDIOLOGY | Facility: CLINIC | Age: 55
End: 2025-08-14
Payer: COMMERCIAL

## 2025-08-14 ENCOUNTER — RESULT CHARGE (OUTPATIENT)
Age: 55
End: 2025-08-14

## 2025-08-14 VITALS
SYSTOLIC BLOOD PRESSURE: 120 MMHG | WEIGHT: 160.06 LBS | RESPIRATION RATE: 20 BRPM | DIASTOLIC BLOOD PRESSURE: 74 MMHG | HEIGHT: 65.75 IN | HEART RATE: 82 BPM | OXYGEN SATURATION: 97 % | BODY MASS INDEX: 26.03 KG/M2

## 2025-08-14 DIAGNOSIS — Q22.5 EBSTEIN'S ANOMALY: ICD-10-CM

## 2025-08-14 DIAGNOSIS — I48.0 PAROXYSMAL ATRIAL FIBRILLATION: ICD-10-CM

## 2025-08-14 DIAGNOSIS — Q21.10 ATRIAL SEPTAL DEFECT, UNSPECIFIED: ICD-10-CM

## 2025-08-14 PROCEDURE — G0404: CPT

## 2025-08-14 PROCEDURE — 99214 OFFICE O/P EST MOD 30 MIN: CPT

## 2025-08-14 RX ORDER — APIXABAN 5 MG/1
5 TABLET, FILM COATED ORAL
Refills: 0 | Status: ACTIVE | COMMUNITY